# Patient Record
Sex: MALE | Race: WHITE | NOT HISPANIC OR LATINO
[De-identification: names, ages, dates, MRNs, and addresses within clinical notes are randomized per-mention and may not be internally consistent; named-entity substitution may affect disease eponyms.]

---

## 2017-02-10 VITALS
DIASTOLIC BLOOD PRESSURE: 81 MMHG | OXYGEN SATURATION: 100 % | RESPIRATION RATE: 16 BRPM | HEIGHT: 70 IN | TEMPERATURE: 96 F | SYSTOLIC BLOOD PRESSURE: 130 MMHG | WEIGHT: 173.94 LBS | HEART RATE: 63 BPM

## 2017-02-10 NOTE — PATIENT PROFILE ADULT. - PSH
H/O arthroscopy of right knee  x2  H/O breast surgery  left  H/O lithotripsy    History of appendectomy

## 2017-02-10 NOTE — PATIENT PROFILE ADULT. - ABILITY TO HEAR (WITH HEARING AID OR HEARING APPLIANCE IF NORMALLY USED):
uses hearing aid left ear/Mildly to Moderately Impaired: difficulty hearing in some environments or speaker may need to increase volume or speak distinctly

## 2017-02-12 ENCOUNTER — RESULT REVIEW (OUTPATIENT)
Age: 47
End: 2017-02-12

## 2017-02-13 ENCOUNTER — INPATIENT (INPATIENT)
Facility: HOSPITAL | Age: 47
LOS: 0 days | Discharge: HOME | DRG: 543 | End: 2017-02-13
Attending: DENTIST | Admitting: DENTIST
Payer: COMMERCIAL

## 2017-02-13 VITALS — HEART RATE: 60 BPM | DIASTOLIC BLOOD PRESSURE: 82 MMHG | OXYGEN SATURATION: 98 % | SYSTOLIC BLOOD PRESSURE: 132 MMHG

## 2017-02-13 DIAGNOSIS — Z98.890 OTHER SPECIFIED POSTPROCEDURAL STATES: Chronic | ICD-10-CM

## 2017-02-13 DIAGNOSIS — Z90.49 ACQUIRED ABSENCE OF OTHER SPECIFIED PARTS OF DIGESTIVE TRACT: Chronic | ICD-10-CM

## 2017-02-13 PROCEDURE — 73706 CT ANGIO LWR EXTR W/O&W/DYE: CPT | Mod: 26,50

## 2017-02-13 RX ORDER — MORPHINE SULFATE 50 MG/1
4 CAPSULE, EXTENDED RELEASE ORAL
Qty: 0 | Refills: 0 | Status: DISCONTINUED | OUTPATIENT
Start: 2017-02-13 | End: 2017-02-13

## 2017-02-13 RX ORDER — CHLORHEXIDINE GLUCONATE 213 G/1000ML
3 SOLUTION TOPICAL
Qty: 1 | Refills: 0 | OUTPATIENT
Start: 2017-02-13 | End: 2017-02-18

## 2017-02-13 RX ORDER — ONDANSETRON 8 MG/1
4 TABLET, FILM COATED ORAL EVERY 6 HOURS
Qty: 0 | Refills: 0 | Status: DISCONTINUED | OUTPATIENT
Start: 2017-02-13 | End: 2017-02-13

## 2017-02-13 RX ADMIN — ONDANSETRON 4 MILLIGRAM(S): 8 TABLET, FILM COATED ORAL at 12:22

## 2017-02-14 PROCEDURE — 88331 PATH CONSLTJ SURG 1 BLK 1SPC: CPT

## 2017-02-14 PROCEDURE — 73706 CT ANGIO LWR EXTR W/O&W/DYE: CPT

## 2017-02-14 PROCEDURE — 88302 TISSUE EXAM BY PATHOLOGIST: CPT

## 2017-02-14 PROCEDURE — 88311 DECALCIFY TISSUE: CPT

## 2017-02-14 PROCEDURE — 88305 TISSUE EXAM BY PATHOLOGIST: CPT

## 2017-02-15 LAB — SURGICAL PATHOLOGY STUDY: SIGNIFICANT CHANGE UP

## 2017-02-16 ENCOUNTER — APPOINTMENT (OUTPATIENT)
Dept: PLASTIC SURGERY | Facility: CLINIC | Age: 47
End: 2017-02-16

## 2017-02-16 DIAGNOSIS — C41.1 MALIGNANT NEOPLASM OF MANDIBLE: ICD-10-CM

## 2017-02-16 DIAGNOSIS — D49.2 NEOPLASM OF UNSPECIFIED BEHAVIOR OF BONE, SOFT TISSUE, AND SKIN: ICD-10-CM

## 2017-02-16 DIAGNOSIS — I10 ESSENTIAL (PRIMARY) HYPERTENSION: ICD-10-CM

## 2017-02-16 DIAGNOSIS — E78.5 HYPERLIPIDEMIA, UNSPECIFIED: ICD-10-CM

## 2017-02-16 DIAGNOSIS — G47.33 OBSTRUCTIVE SLEEP APNEA (ADULT) (PEDIATRIC): ICD-10-CM

## 2017-02-16 DIAGNOSIS — Z88.0 ALLERGY STATUS TO PENICILLIN: ICD-10-CM

## 2017-02-16 DIAGNOSIS — K50.90 CROHN'S DISEASE, UNSPECIFIED, WITHOUT COMPLICATIONS: ICD-10-CM

## 2017-02-16 RX ORDER — RIFAXIMIN 550 MG/1
550 TABLET ORAL
Refills: 0 | Status: ACTIVE | COMMUNITY

## 2017-02-16 RX ORDER — ATORVASTATIN CALCIUM 10 MG/1
10 TABLET, FILM COATED ORAL
Refills: 0 | Status: ACTIVE | COMMUNITY

## 2017-02-16 RX ORDER — LOSARTAN POTASSIUM AND HYDROCHLOROTHIAZIDE 12.5; 5 MG/1; MG/1
50-12.5 TABLET ORAL
Refills: 0 | Status: ACTIVE | COMMUNITY

## 2017-02-28 ENCOUNTER — RESULT REVIEW (OUTPATIENT)
Age: 47
End: 2017-02-28

## 2017-02-28 VITALS — HEIGHT: 71 IN | WEIGHT: 173.06 LBS

## 2017-02-28 NOTE — PATIENT PROFILE ADULT. - ABILITY TO HEAR (WITH HEARING AID OR HEARING APPLIANCE IF NORMALLY USED):
Mildly to Moderately Impaired: difficulty hearing in some environments or speaker may need to increase volume or speak distinctly/uses hearing aid left ear

## 2017-02-28 NOTE — PATIENT PROFILE ADULT. - PSH
H/O arthroscopy of right knee  x2  H/O breast surgery  left  H/O lithotripsy    History of appendectomy H/O arthroscopy of right knee  x2  H/O breast surgery  left  H/O lithotripsy    History of appendectomy    Surgery, elective  Mole removed from back 2/2017

## 2017-03-01 ENCOUNTER — INPATIENT (INPATIENT)
Facility: HOSPITAL | Age: 47
LOS: 7 days | Discharge: HOME | DRG: 129 | End: 2017-03-09
Attending: DENTIST | Admitting: DENTIST
Payer: COMMERCIAL

## 2017-03-01 DIAGNOSIS — Z41.9 ENCOUNTER FOR PROCEDURE FOR PURPOSES OTHER THAN REMEDYING HEALTH STATE, UNSPECIFIED: Chronic | ICD-10-CM

## 2017-03-01 DIAGNOSIS — Z98.890 OTHER SPECIFIED POSTPROCEDURAL STATES: Chronic | ICD-10-CM

## 2017-03-01 DIAGNOSIS — Z90.49 ACQUIRED ABSENCE OF OTHER SPECIFIED PARTS OF DIGESTIVE TRACT: Chronic | ICD-10-CM

## 2017-03-01 LAB
ANION GAP SERPL CALC-SCNC: 7 MMOL/L — LOW (ref 9–16)
APTT BLD: 29.9 SEC — SIGNIFICANT CHANGE UP (ref 27.5–37.4)
BUN SERPL-MCNC: 17 MG/DL — SIGNIFICANT CHANGE UP (ref 7–23)
CALCIUM SERPL-MCNC: 8.8 MG/DL — SIGNIFICANT CHANGE UP (ref 8.5–10.5)
CHLORIDE SERPL-SCNC: 104 MMOL/L — SIGNIFICANT CHANGE UP (ref 96–108)
CO2 SERPL-SCNC: 28 MMOL/L — SIGNIFICANT CHANGE UP (ref 22–31)
CREAT SERPL-MCNC: 0.78 MG/DL — SIGNIFICANT CHANGE UP (ref 0.5–1.3)
GLUCOSE SERPL-MCNC: 144 MG/DL — HIGH (ref 70–99)
HCT VFR BLD CALC: 37 % — LOW (ref 39–50)
HGB BLD-MCNC: 13.1 G/DL — SIGNIFICANT CHANGE UP (ref 13–17)
INR BLD: 1.23 — HIGH (ref 0.88–1.16)
MAGNESIUM SERPL-MCNC: 1.8 MG/DL — SIGNIFICANT CHANGE UP (ref 1.6–2.4)
MCHC RBC-ENTMCNC: 30 PG — SIGNIFICANT CHANGE UP (ref 27–34)
MCHC RBC-ENTMCNC: 35.4 G/DL — SIGNIFICANT CHANGE UP (ref 32–36)
MCV RBC AUTO: 84.9 FL — SIGNIFICANT CHANGE UP (ref 80–100)
PHOSPHATE SERPL-MCNC: 3.3 MG/DL — SIGNIFICANT CHANGE UP (ref 2.5–4.5)
PLATELET # BLD AUTO: 263 K/UL — SIGNIFICANT CHANGE UP (ref 150–400)
POTASSIUM SERPL-MCNC: 3.9 MMOL/L — SIGNIFICANT CHANGE UP (ref 3.5–5.3)
POTASSIUM SERPL-SCNC: 3.9 MMOL/L — SIGNIFICANT CHANGE UP (ref 3.5–5.3)
PROTHROM AB SERPL-ACNC: 13.7 SEC — HIGH (ref 10–13.1)
RBC # BLD: 4.36 M/UL — SIGNIFICANT CHANGE UP (ref 4.2–5.8)
RBC # FLD: 12.1 % — SIGNIFICANT CHANGE UP (ref 10.3–16.9)
SODIUM SERPL-SCNC: 139 MMOL/L — SIGNIFICANT CHANGE UP (ref 135–145)
WBC # BLD: 16.3 K/UL — HIGH (ref 3.8–10.5)
WBC # FLD AUTO: 16.3 K/UL — HIGH (ref 3.8–10.5)

## 2017-03-01 PROCEDURE — 40845 RECONSTRUCTION OF MOUTH: CPT | Mod: 59

## 2017-03-01 PROCEDURE — 99221 1ST HOSP IP/OBS SF/LOW 40: CPT | Mod: GC

## 2017-03-01 PROCEDURE — 20962 OTHER BONE GRAFT MICROVASC: CPT

## 2017-03-01 PROCEDURE — 21198 RECONSTR LWR JAW SEGMENT: CPT | Mod: 59

## 2017-03-01 PROCEDURE — 71010: CPT | Mod: 26

## 2017-03-01 RX ORDER — DEXAMETHASONE 0.5 MG/5ML
8 ELIXIR ORAL EVERY 12 HOURS
Qty: 0 | Refills: 0 | Status: DISCONTINUED | OUTPATIENT
Start: 2017-03-01 | End: 2017-03-03

## 2017-03-01 RX ORDER — MORPHINE SULFATE 50 MG/1
4 CAPSULE, EXTENDED RELEASE ORAL EVERY 4 HOURS
Qty: 0 | Refills: 0 | Status: DISCONTINUED | OUTPATIENT
Start: 2017-03-01 | End: 2017-03-02

## 2017-03-01 RX ORDER — LOSARTAN POTASSIUM 100 MG/1
50 TABLET, FILM COATED ORAL DAILY
Qty: 0 | Refills: 0 | Status: DISCONTINUED | OUTPATIENT
Start: 2017-03-01 | End: 2017-03-07

## 2017-03-01 RX ORDER — ONDANSETRON 8 MG/1
4 TABLET, FILM COATED ORAL EVERY 6 HOURS
Qty: 0 | Refills: 0 | Status: DISCONTINUED | OUTPATIENT
Start: 2017-03-01 | End: 2017-03-09

## 2017-03-01 RX ORDER — LABETALOL HCL 100 MG
10 TABLET ORAL EVERY 6 HOURS
Qty: 0 | Refills: 0 | Status: DISCONTINUED | OUTPATIENT
Start: 2017-03-01 | End: 2017-03-09

## 2017-03-01 RX ORDER — SODIUM CHLORIDE 9 MG/ML
1000 INJECTION INTRAMUSCULAR; INTRAVENOUS; SUBCUTANEOUS
Qty: 0 | Refills: 0 | Status: DISCONTINUED | OUTPATIENT
Start: 2017-03-01 | End: 2017-03-03

## 2017-03-01 RX ADMIN — Medication 101.6 MILLIGRAM(S): at 19:27

## 2017-03-01 RX ADMIN — Medication 100 MILLIGRAM(S): at 22:26

## 2017-03-01 RX ADMIN — MORPHINE SULFATE 4 MILLIGRAM(S): 50 CAPSULE, EXTENDED RELEASE ORAL at 18:13

## 2017-03-01 RX ADMIN — MORPHINE SULFATE 4 MILLIGRAM(S): 50 CAPSULE, EXTENDED RELEASE ORAL at 19:27

## 2017-03-01 RX ADMIN — ONDANSETRON 4 MILLIGRAM(S): 8 TABLET, FILM COATED ORAL at 19:55

## 2017-03-01 NOTE — PROGRESS NOTE ADULT - SUBJECTIVE AND OBJECTIVE BOX
ENT HNS Post-Op    Patient doing well. Pain controlled. Breathing comfortably. No N/V.    AFVSS, NAD  Arterial and venous doppler signals normal  OC/OP: resection incision line intact  Right neck: flat, incision c/d/i, implanted doppler wires secured  Left lower leg wrapped, MARCO A with serosang output  NGT sutured to membranous septum. L nasal ala erythema minimal, blister-like lesion present    A/P: POD#0 s/p R segmental mandibulectomy, L fibula MVFF recon, R neck exploration. Doing well  - NPO/ IVF  - confirm NGT placement w/ CXR  - Patient should be normotensive. No pressors. Please give fluid boluses only if hypotensive. SBP < 160  - HOB elevated, no circumferential neck ties or pressure on right neck. Neck should be straight in neutral position or slight flexion  - Clindamycin  - Decadron taper  - Bed rest today, can be OOB to chair tomorow  - SQH and oral rinses start tomorrow  - No ASA

## 2017-03-01 NOTE — CONSULT NOTE ADULT - SUBJECTIVE AND OBJECTIVE BOX
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 Now indicated for right mandibular body resection and referred for consult for free osteocutaneous fibula flap reconstruction.       PAST MEDICAL & SURGICAL HISTORY:  Kidney stones  JORGITO on CPAP  Crohns disease: mild  Arthritis  Hypertension  Surgery, elective: Mole removed from back 2/2017  H/O breast surgery: left  H/O arthroscopy of right knee: x2  H/O lithotripsy  History of appendectomy      ROS: See HPI    MEDICATIONS  (STANDING):  sodium chloride 0.9%. 1000milliLiter(s) IV Continuous <Continuous>  dexamethasone  IVPB 8milliGRAM(s) IV Intermittent every 12 hours  clindamycin IVPB  IV Intermittent     MEDICATIONS  (PRN):  ondansetron Injectable 4milliGRAM(s) IV Push every 6 hours PRN Nausea  morphine  - Injectable 4milliGRAM(s) IV Push every 4 hours PRN Severe Pain (7 - 10)  oxyCODONE  5 mG/acetaminophen 325 mG 2Tablet(s) Oral every 4 hours PRN Mild Pain (1 - 3)  labetalol Injectable 10milliGRAM(s) IV Push every 6 hours PRN Systolic blood pressure > 170      Allergies    penicillins (Hives)    Intolerances        SOCIAL HISTORY:  Smoke: Never Smoker  EtOH: occasional    FAMILY HISTORY:      Vital Signs Last 24 Hrs  T(C): --  T(F): --  HR: --  BP: --  BP(mean): --  RR: --  SpO2: --    PHYSICAL EXAM    Gen: NAD   CV: RRR  Pulm: CTAB  Abd: Soft, NT/ND  Ext:     LABS:                RADIOLOGY & ADDITIONAL STUDIES:  48 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 Now indicated for right mandibular body resection and referred for consult for free osteocutaneous fibula flap reconstruction.       PAST MEDICAL & SURGICAL HISTORY:  Kidney stones  JORGITO on CPAP  Crohns disease: mild  Arthritis  Hypertension  Surgery, elective: Mole removed from back 2/2017  H/O breast surgery: left  H/O arthroscopy of right knee: x2  H/O lithotripsy  History of appendectomy      ROS: See HPI    MEDICATIONS  (STANDING):  sodium chloride 0.9%. 1000milliLiter(s) IV Continuous <Continuous>  dexamethasone  IVPB 8milliGRAM(s) IV Intermittent every 12 hours  clindamycin IVPB  IV Intermittent     MEDICATIONS  (PRN):  ondansetron Injectable 4milliGRAM(s) IV Push every 6 hours PRN Nausea  morphine  - Injectable 4milliGRAM(s) IV Push every 4 hours PRN Severe Pain (7 - 10)  oxyCODONE  5 mG/acetaminophen 325 mG 2Tablet(s) Oral every 4 hours PRN Mild Pain (1 - 3)  labetalol Injectable 10milliGRAM(s) IV Push every 6 hours PRN Systolic blood pressure > 170      Allergies    penicillins (Hives)    Intolerances        SOCIAL HISTORY:  Smoke: Never Smoker  EtOH: occasional      Home Meds: Lipitor 10 MG Oral Tablet, Losartan Potassium-HCTZ 50-12.5 MG Oral Tablet, Xifaxan 550 MG Oral Tablet  All: PCN;s      Vital Signs Last 24 Hrs(Pending)   T(C): --  T(F): --  HR: --  BP: --  BP(mean): --  RR: --  SpO2: --    PHYSICAL EXAM    Gen: NAD   Neuro: A&ox3 No deficits  ENT: incisions c/d/i doppler+   CV: RRR reg s1 s2   Pulm: CTAB  Abd: Soft, NT/ND  Ext: wwp no c/c/e + dp b/l + ace bandage in place to left leg    LABS:  pending               RADIOLOGY & ADDITIONAL STUDIES:  48 y/o m with mandibular ameloblastoma now s/p mandibulectomy and fibular free flap  Assessment and Plan:  Neuro: decadron 8 q12, morphine, percocet  ENT: Flap check q 1   CV: HD stable normo< 10 sbp goal labetalol NS @100  Pulm: Satting well on   GI: npo NGT sutured in place  : Holly  ID: unasyn ( 3/1-) clindamycin( 3/1- )   Endo: ISS  PPX: SCD   Lines: PIV Karlene HPI:   48 yo male with right jaw pain ~ 6-7 months went for evaluation by dentist and eventually seen by oral surgeon and had panorex x-ray which demonstrated 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 Now indicated for right mandibular body resection and referred for consult for free osteocutaneous fibula flap reconstruction.       PAST MEDICAL & SURGICAL HISTORY:  Kidney stones  JORGITO on CPAP  Crohns disease: mild  Arthritis  Hypertension  Surgery, elective: Mole removed from back 2/2017  H/O breast surgery: left  H/O arthroscopy of right knee: x2  H/O lithotripsy  History of appendectomy      ROS: + right shoulder pain. No HA, dizzyness, CP, sob, abd pain, dysuria, nausea    MEDICATIONS  (STANDING):  sodium chloride 0.9%. 1000milliLiter(s) IV Continuous <Continuous>  dexamethasone  IVPB 8milliGRAM(s) IV Intermittent every 12 hours  clindamycin IVPB  IV Intermittent     MEDICATIONS  (PRN):  ondansetron Injectable 4milliGRAM(s) IV Push every 6 hours PRN Nausea  morphine  - Injectable 4milliGRAM(s) IV Push every 4 hours PRN Severe Pain (7 - 10)  oxyCODONE  5 mG/acetaminophen 325 mG 2Tablet(s) Oral every 4 hours PRN Mild Pain (1 - 3)  labetalol Injectable 10milliGRAM(s) IV Push every 6 hours PRN Systolic blood pressure > 170      Allergies    penicillins (Hives)    Intolerances        SOCIAL HISTORY:  Smoke: Never Smoker  EtOH: occasional      Home Meds: Lipitor 10 MG Oral Tablet, Losartan Potassium-HCTZ 50-12.5 MG Oral Tablet, Xifaxan 550 MG Oral Tablet  All: PCN;s      Vital Signs Last 24 Hrs(Pending)   T(C): --  T(F): --37.2  HR: --64  BP: --172/88  BP(mean): --  RR: --16  SpO2: --98%    PHYSICAL EXAM    Gen: NAD   HEENT: MMM PERRL, EOMI  Neuro: A&ox3 No deficits  ENT: incisions c/d/i doppler+   CV: RRR reg s1 s2   Pulm: CTAB  Abd: Soft, NT/ND   elizondo in place  Ext/vasc: wwp no c/c/e + dp b/l + ace bandage in place to left leg  MSK: no joint swelling    LABS:  pending               RADIOLOGY & ADDITIONAL STUDIES: CXR clear  48 y/o m with h/o HTN with mandibular ameloblastoma now s/p mandibulectomy and fibular free flap  Assessment and Plan:  Neuro: decadron 8 q12, morphine, percocet  ENT: Flap check q 1   CV: HD stable normo< 160 sbp goal labetalol NS @100  Pulm: Satting well on NC  GI: npo NGT sutured in place  : Elizondo  ID: unasyn ( 3/1-) clindamycin( 3/1- )   Endo: ISS  PPX: SCD   Lines: JENNIFER Soler

## 2017-03-01 NOTE — H&P ADULT - HISTORY OF PRESENT ILLNESS
47 M found by dentist to have R mandibular body mass on x-ray after having discomfort for several months. 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 Admitted today for right segmental mandibulectomy, fibula free flap recon, and neck exploration.    PMH: Chron's, HTN, HLD  Allergies: PCN

## 2017-03-01 NOTE — BRIEF OPERATIVE NOTE - PROCEDURE
Incision and exploration, neck  03/01/2017  right  Active  OAHMED  Free fibula flap reconstruction of mandible  03/01/2017  left  Active  OAHMED  Segmental mandibulectomy  03/01/2017  right  Active  OAHMED

## 2017-03-02 LAB
ANION GAP SERPL CALC-SCNC: 6 MMOL/L — LOW (ref 9–16)
BUN SERPL-MCNC: 17 MG/DL — SIGNIFICANT CHANGE UP (ref 7–23)
CALCIUM SERPL-MCNC: 8.7 MG/DL — SIGNIFICANT CHANGE UP (ref 8.5–10.5)
CHLORIDE SERPL-SCNC: 103 MMOL/L — SIGNIFICANT CHANGE UP (ref 96–108)
CO2 SERPL-SCNC: 28 MMOL/L — SIGNIFICANT CHANGE UP (ref 22–31)
CREAT SERPL-MCNC: 0.76 MG/DL — SIGNIFICANT CHANGE UP (ref 0.5–1.3)
GLUCOSE SERPL-MCNC: 137 MG/DL — HIGH (ref 70–99)
HCT VFR BLD CALC: 35.6 % — LOW (ref 39–50)
HGB BLD-MCNC: 12.2 G/DL — LOW (ref 13–17)
MAGNESIUM SERPL-MCNC: 1.8 MG/DL — SIGNIFICANT CHANGE UP (ref 1.6–2.4)
MCHC RBC-ENTMCNC: 29.6 PG — SIGNIFICANT CHANGE UP (ref 27–34)
MCHC RBC-ENTMCNC: 34.3 G/DL — SIGNIFICANT CHANGE UP (ref 32–36)
MCV RBC AUTO: 86.4 FL — SIGNIFICANT CHANGE UP (ref 80–100)
PHOSPHATE SERPL-MCNC: 3 MG/DL — SIGNIFICANT CHANGE UP (ref 2.5–4.5)
PLATELET # BLD AUTO: 256 K/UL — SIGNIFICANT CHANGE UP (ref 150–400)
POTASSIUM SERPL-MCNC: 3.8 MMOL/L — SIGNIFICANT CHANGE UP (ref 3.5–5.3)
POTASSIUM SERPL-SCNC: 3.8 MMOL/L — SIGNIFICANT CHANGE UP (ref 3.5–5.3)
RBC # BLD: 4.12 M/UL — LOW (ref 4.2–5.8)
RBC # FLD: 12.7 % — SIGNIFICANT CHANGE UP (ref 10.3–16.9)
SODIUM SERPL-SCNC: 137 MMOL/L — SIGNIFICANT CHANGE UP (ref 135–145)
WBC # BLD: 17.8 K/UL — HIGH (ref 3.8–10.5)
WBC # FLD AUTO: 17.8 K/UL — HIGH (ref 3.8–10.5)

## 2017-03-02 PROCEDURE — 71010: CPT | Mod: 26

## 2017-03-02 RX ORDER — ENOXAPARIN SODIUM 100 MG/ML
40 INJECTION SUBCUTANEOUS DAILY
Qty: 0 | Refills: 0 | Status: DISCONTINUED | OUTPATIENT
Start: 2017-03-02 | End: 2017-03-09

## 2017-03-02 RX ORDER — DIPHENHYDRAMINE HCL 50 MG
25 CAPSULE ORAL ONCE
Qty: 0 | Refills: 0 | Status: COMPLETED | OUTPATIENT
Start: 2017-03-02 | End: 2017-03-02

## 2017-03-02 RX ORDER — ACETAMINOPHEN 500 MG
1000 TABLET ORAL ONCE
Qty: 0 | Refills: 0 | Status: COMPLETED | OUTPATIENT
Start: 2017-03-02 | End: 2017-03-02

## 2017-03-02 RX ORDER — MORPHINE SULFATE 50 MG/1
2 CAPSULE, EXTENDED RELEASE ORAL EVERY 4 HOURS
Qty: 0 | Refills: 0 | Status: DISCONTINUED | OUTPATIENT
Start: 2017-03-02 | End: 2017-03-06

## 2017-03-02 RX ORDER — POTASSIUM CHLORIDE 20 MEQ
10 PACKET (EA) ORAL ONCE
Qty: 0 | Refills: 0 | Status: COMPLETED | OUTPATIENT
Start: 2017-03-02 | End: 2017-03-02

## 2017-03-02 RX ORDER — MAGNESIUM SULFATE 500 MG/ML
1 VIAL (ML) INJECTION ONCE
Qty: 0 | Refills: 0 | Status: COMPLETED | OUTPATIENT
Start: 2017-03-02 | End: 2017-03-02

## 2017-03-02 RX ADMIN — MORPHINE SULFATE 4 MILLIGRAM(S): 50 CAPSULE, EXTENDED RELEASE ORAL at 03:15

## 2017-03-02 RX ADMIN — Medication 100 MILLIGRAM(S): at 07:19

## 2017-03-02 RX ADMIN — Medication 100 GRAM(S): at 09:26

## 2017-03-02 RX ADMIN — ENOXAPARIN SODIUM 40 MILLIGRAM(S): 100 INJECTION SUBCUTANEOUS at 13:23

## 2017-03-02 RX ADMIN — Medication 25 MILLIGRAM(S): at 22:00

## 2017-03-02 RX ADMIN — Medication 400 MILLIGRAM(S): at 22:00

## 2017-03-02 RX ADMIN — Medication 101.6 MILLIGRAM(S): at 19:29

## 2017-03-02 RX ADMIN — MORPHINE SULFATE 4 MILLIGRAM(S): 50 CAPSULE, EXTENDED RELEASE ORAL at 04:17

## 2017-03-02 RX ADMIN — Medication 1000 MILLIGRAM(S): at 13:23

## 2017-03-02 RX ADMIN — Medication 400 MILLIGRAM(S): at 13:23

## 2017-03-02 RX ADMIN — Medication 1000 MILLIGRAM(S): at 22:52

## 2017-03-02 RX ADMIN — LOSARTAN POTASSIUM 50 MILLIGRAM(S): 100 TABLET, FILM COATED ORAL at 13:23

## 2017-03-02 RX ADMIN — Medication 100 MILLIGRAM(S): at 16:08

## 2017-03-02 RX ADMIN — Medication 101.6 MILLIGRAM(S): at 07:03

## 2017-03-02 NOTE — DIETITIAN INITIAL EVALUATION ADULT. - ORAL INTAKE PTA
good/Pt with good appetite and consuming 3 meals/day at home. Pt was taking fish oil, vitamin D, and probiotics, however, stopped several weeks ago 2/2 to MD advice prior to surgery.

## 2017-03-02 NOTE — PROGRESS NOTE ADULT - SUBJECTIVE AND OBJECTIVE BOX
Patient doing well Over night   Up and awake during exam, (alert and oriented )     Donor site Looks intact (splint on at the moment)   Neck incision intact  Dopplers on the artery and vein both present   Drain in neck and leg are puttign out 50 cc respectively   Mostly serosanguinous in quality     Intra oral examination normal       Plan  Disposition as per ENT  Plastics ok with ambulation but needs to have neck neutral   Partial weight bearing on the Donor site is ok (however may be difficult with splint on at the moment)

## 2017-03-02 NOTE — DIETITIAN INITIAL EVALUATION ADULT. - NS AS NUTRI INTERV ED CONTENT
Discussed initiation of TF through NG tube; pt and wife verbalized understanding/Purpose of the nutrition education

## 2017-03-02 NOTE — PROGRESS NOTE ADULT - SUBJECTIVE AND OBJECTIVE BOX
ENT [facility] DAILY PROGRESS NOTE    Interval HPI: 47y Male s/p R segmental mandibulectomy and R neck exploration with fibula free flap reconstruction on 3/1/17. POD 0: H/H stable. Flap well perfused.     Overnight events: POD 1- no acute events overnight, Pain controlled. Breathing comfortably. No N/V. Flap well perfused, NGT advanced.       Allergies  penicillins (Hives)      MEDICATIONS:  Antiinfectives:   clindamycin IVPB  IV Intermittent   clindamycin IVPB 600milliGRAM(s) IV Intermittent every 8 hours    IV fluids:  sodium chloride 0.9%. 1000milliLiter(s) IV Continuous <Continuous>  potassium chloride  10 mEq/100 mL IVPB 10milliEquivalent(s) IV Intermittent once    Hematologic/Anticoagulation:  enoxaparin Injectable 40milliGRAM(s) SubCutaneous daily    Pain medications/Neuro:  ondansetron Injectable 4milliGRAM(s) IV Push every 6 hours PRN  oxyCODONE  5 mG/acetaminophen 325 mG 2Tablet(s) Oral every 4 hours PRN    Endocrine Medications:   dexamethasone  IVPB 8milliGRAM(s) IV Intermittent every 12 hours    All other standing medications:   losartan 50milliGRAM(s) Oral daily    All other PRN medications:  labetalol Injectable 10milliGRAM(s) IV Push every 6 hours PRN      Vital Signs Last 24 Hrs  T(C): 36.8, Max: 37.1 (03-01 @ 17:50)  T(F): 98.3, Max: 98.7 (03-01 @ 17:50)  HR: 54 (50 - 70)  BP: 126/68 (126/68 - 150/78)  BP(mean): 93 (67 - 108)  RR: 13 (8 - 20)  SpO2: 99% (94% - 100%)    I & Os for 24h ending 03-02 @ 07:00  =============================================  IN:    sodium chloride 0.9%.: 1150 ml    IV PiggyBack: 250 ml    Total IN: 1400 ml  ---------------------------------------------  OUT:    Indwelling Catheter - Urethral: 920 ml    Bulb: 50 ml    Bulb: 50 ml    Total OUT: 1020 ml  ---------------------------------------------  Total NET: 380 ml    I & Os for current day (as of 03-02 @ 10:22)  =============================================  IN:    sodium chloride 0.9%.: 100 ml    Total IN: 100 ml  ---------------------------------------------  OUT:    Indwelling Catheter - Urethral: 525 ml    Total OUT: 525 ml  ---------------------------------------------  Total NET: -425 ml        PHYSICAL EXAM:    AFVSS, NAD  Arterial and venous doppler signals normal  OC/OP: resection incision line intact  Right neck: flat, incision c/d/i, implanted doppler wires secured  Left lower leg in splint, MARCO A with serosang output  NGT sutured to membranous septum. L nasal ala erythema minimal, blister-like lesion present    LABS:  CBC-                        12.2   17.8  )-----------( 256      ( 02 Mar 2017 04:21 )             35.6     BMP/CMP-  02 Mar 2017 04:21    137    |  103    |  17     ----------------------------<  137    3.8     |  28     |  0.76     Ca    8.7        02 Mar 2017 04:21  Phos  3.0       02 Mar 2017 04:21  Mg     1.8       02 Mar 2017 04:21      Coagulation Studies-  PT/INR - ( 01 Mar 2017 17:53 )   PT: 13.7 sec;   INR: 1.23     PTT - ( 01 Mar 2017 17:53 )  PTT:29.9 sec      Assessment/Plan:  47y Male POD#1 s/p R segmental mandibulectomy, L fibula MVFF recon, R neck exploration.   - NPO/ IVF  - confirm NGT placement w/ CXR then OK to use for meds  - OK to start trickle feeds if no n/v once NGT confirmed in place  - Patient should be normotensive. No pressors. Please give fluid boluses only if hypotensive. SBP < 160  - HOB elevated, no circumferential neck ties or pressure on right neck. Neck should be straight in neutral position or slight flexion  - Clindamycin  - Decadron taper  - OOB to chair with leg elevated when OOB, can be partial weight bearing to L leg  - SQH and oral rinses start to start today  - No ASA      - d/w attending MD Elan Lawler whom agrees with the above plan      PPX: SCDs, DVT ppx with Lovenox per Dr. Lerman    Dispo planning:   -Home care TBD by PT needs ENT [facility] DAILY PROGRESS NOTE    Interval HPI: 47y Male s/p R segmental mandibulectomy and R neck exploration with fibula free flap reconstruction on 3/1/17. POD 0: H/H stable. Flap well perfused.     Overnight events: POD 1- no acute events overnight, Pain controlled. Breathing comfortably. No N/V. Flap well perfused, NGT advanced.       Allergies  penicillins (Hives)      MEDICATIONS:  Antiinfectives:   clindamycin IVPB  IV Intermittent   clindamycin IVPB 600milliGRAM(s) IV Intermittent every 8 hours    IV fluids:  sodium chloride 0.9%. 1000milliLiter(s) IV Continuous <Continuous>  potassium chloride  10 mEq/100 mL IVPB 10milliEquivalent(s) IV Intermittent once    Hematologic/Anticoagulation:  enoxaparin Injectable 40milliGRAM(s) SubCutaneous daily    Pain medications/Neuro:  ondansetron Injectable 4milliGRAM(s) IV Push every 6 hours PRN  oxyCODONE  5 mG/acetaminophen 325 mG 2Tablet(s) Oral every 4 hours PRN    Endocrine Medications:   dexamethasone  IVPB 8milliGRAM(s) IV Intermittent every 12 hours    All other standing medications:   losartan 50milliGRAM(s) Oral daily    All other PRN medications:  labetalol Injectable 10milliGRAM(s) IV Push every 6 hours PRN      Vital Signs Last 24 Hrs  T(C): 36.8, Max: 37.1 (03-01 @ 17:50)  T(F): 98.3, Max: 98.7 (03-01 @ 17:50)  HR: 54 (50 - 70)  BP: 126/68 (126/68 - 150/78)  BP(mean): 93 (67 - 108)  RR: 13 (8 - 20)  SpO2: 99% (94% - 100%)    I & Os for 24h ending 03-02 @ 07:00  =============================================  IN:    sodium chloride 0.9%.: 1150 ml    IV PiggyBack: 250 ml    Total IN: 1400 ml  ---------------------------------------------  OUT:    Indwelling Catheter - Urethral: 920 ml    Bulb: 50 ml    Bulb: 50 ml    Total OUT: 1020 ml  ---------------------------------------------  Total NET: 380 ml    I & Os for current day (as of 03-02 @ 10:22)  =============================================  IN:    sodium chloride 0.9%.: 100 ml    Total IN: 100 ml  ---------------------------------------------  OUT:    Indwelling Catheter - Urethral: 525 ml    Total OUT: 525 ml  ---------------------------------------------  Total NET: -425 ml        PHYSICAL EXAM:    AFVSS, NAD  Arterial and venous doppler signals normal  OC/OP: resection incision line intact  Right neck: flat, incision c/d/i, implanted doppler wires secured  Left lower leg in splint, MARCO A with serosang output  NGT sutured to membranous septum. L nasal ala erythema minimal, blister-like lesion present    LABS:  CBC-                        12.2   17.8  )-----------( 256      ( 02 Mar 2017 04:21 )             35.6     BMP/CMP-  02 Mar 2017 04:21    137    |  103    |  17     ----------------------------<  137    3.8     |  28     |  0.76     Ca    8.7        02 Mar 2017 04:21  Phos  3.0       02 Mar 2017 04:21  Mg     1.8       02 Mar 2017 04:21      Coagulation Studies-  PT/INR - ( 01 Mar 2017 17:53 )   PT: 13.7 sec;   INR: 1.23     PTT - ( 01 Mar 2017 17:53 )  PTT:29.9 sec      Assessment/Plan:  47y Male POD#1 s/p R segmental mandibulectomy, L fibula MVFF recon, R neck exploration.   - NPO/ IVF  - confirm NGT placement w/ CXR then OK to use for meds  - OK to start trickle feeds if no n/v once NGT confirmed in place  - Patient should be normotensive. No pressors. Please give fluid boluses only if hypotensive. SBP < 160  - HOB elevated, no circumferential neck ties or pressure on right neck. Neck should be straight in neutral position or slight flexion  - Clindamycin  - Decadron taper  - OOB to chair with leg elevated when OOB, can be partial weight bearing to L leg with splint  - oral rinses start to start today  - No ASA per Dr. Lerman      - d/w attending MD Elan Lawler whom agrees with the above plan      PPX: SCDs, DVT ppx with Lovenox per Dr. Lerman    Dispo planning:   -Home care TBD by PT needs

## 2017-03-02 NOTE — DIETITIAN INITIAL EVALUATION ADULT. - NS AS NUTRI INTERV ENTERAL NUTRITION
Composition/Recommend Osmolite 1.2 via NG tube @ 70ml/hr goal rate (provides 2000kcal, 94 g pro) Recommend Osmolite 1.2 via NG tube @ 70ml/hr x 24 hr with prostat qday (provides 2116kcal, 108 g pro, 1378mL fluid)/Composition

## 2017-03-02 NOTE — DIETITIAN INITIAL EVALUATION ADULT. - OTHER INFO
Pt admitted with jaw pain x ~6 months PTA. Confirmed ameloblastoma right mandibular body and s/p right mandibular body resection, free osteocutaneous fibula flap reconstruction, and incision and exploration of neck (3/1). Pt denies pain. Currently NPO. Discussed EN nutrition recommendations with pt and wife via NG tube- pt verbalizes understanding. Denies swallowing issues. Pt reports no N/V/D/C. Last BM 3/1. Skin: no edema, surgical incision (3/1). NKFA per pt. High Nutrition Risk. Pt admitted with jaw pain x ~6 months PTA. Confirmed ameloblastoma right mandibular body and s/p right mandibular body resection, free osteocutaneous fibula flap reconstruction, and incision and exploration of neck (3/1). Pt denies pain. Currently NPO. Discussed EN nutrition recommendations with MD and team. Pt denies swallowing issues. Pt reports no N/V/D/C. Last BM 3/1. Skin: no edema, surgical incision (3/1). NKFA per pt. High Nutrition Risk.

## 2017-03-02 NOTE — DIETITIAN INITIAL EVALUATION ADULT. - SOURCE
Wife at bedside, RN, Medical Chart; no previous RD note/family/significant other/patient Wife at bedside, RN, MD and team, Medical Chart; no previous RD note/family/significant other/patient

## 2017-03-02 NOTE — DIETITIAN INITIAL EVALUATION ADULT. - ENERGY NEEDS
ht = 72 inches, wt = 78.5kg (3/1/17), BMI = 24, IBW = 78.2kg, 100% IBW  Utilized CBW due to pt within % of IBW. Estimated needs based on current medical status. ht = 72 inches, wt = 78.5kg (3/1/17), BMI = 24, IBW = 78.2kg, 100% IBW  Utilized current body weight due to pt within % of IBW. Estimated needs based on current medical status.

## 2017-03-03 LAB
ANION GAP SERPL CALC-SCNC: 7 MMOL/L — LOW (ref 9–16)
BUN SERPL-MCNC: 14 MG/DL — SIGNIFICANT CHANGE UP (ref 7–23)
CALCIUM SERPL-MCNC: 8.7 MG/DL — SIGNIFICANT CHANGE UP (ref 8.5–10.5)
CHLORIDE SERPL-SCNC: 107 MMOL/L — SIGNIFICANT CHANGE UP (ref 96–108)
CO2 SERPL-SCNC: 25 MMOL/L — SIGNIFICANT CHANGE UP (ref 22–31)
CREAT SERPL-MCNC: 0.56 MG/DL — SIGNIFICANT CHANGE UP (ref 0.5–1.3)
GLUCOSE SERPL-MCNC: 117 MG/DL — HIGH (ref 70–99)
HCT VFR BLD CALC: 35.3 % — LOW (ref 39–50)
HGB BLD-MCNC: 12 G/DL — LOW (ref 13–17)
MAGNESIUM SERPL-MCNC: 2.4 MG/DL — SIGNIFICANT CHANGE UP (ref 1.6–2.4)
MCHC RBC-ENTMCNC: 30.1 PG — SIGNIFICANT CHANGE UP (ref 27–34)
MCHC RBC-ENTMCNC: 34 G/DL — SIGNIFICANT CHANGE UP (ref 32–36)
MCV RBC AUTO: 88.5 FL — SIGNIFICANT CHANGE UP (ref 80–100)
PHOSPHATE SERPL-MCNC: 2.2 MG/DL — LOW (ref 2.5–4.5)
PLATELET # BLD AUTO: 214 K/UL — SIGNIFICANT CHANGE UP (ref 150–400)
POTASSIUM SERPL-MCNC: 4 MMOL/L — SIGNIFICANT CHANGE UP (ref 3.5–5.3)
POTASSIUM SERPL-SCNC: 4 MMOL/L — SIGNIFICANT CHANGE UP (ref 3.5–5.3)
RBC # BLD: 3.99 M/UL — LOW (ref 4.2–5.8)
RBC # FLD: 12.6 % — SIGNIFICANT CHANGE UP (ref 10.3–16.9)
SODIUM SERPL-SCNC: 139 MMOL/L — SIGNIFICANT CHANGE UP (ref 135–145)
WBC # BLD: 16.5 K/UL — HIGH (ref 3.8–10.5)
WBC # FLD AUTO: 16.5 K/UL — HIGH (ref 3.8–10.5)

## 2017-03-03 RX ORDER — SENNA PLUS 8.6 MG/1
2 TABLET ORAL AT BEDTIME
Qty: 0 | Refills: 0 | Status: DISCONTINUED | OUTPATIENT
Start: 2017-03-03 | End: 2017-03-07

## 2017-03-03 RX ORDER — DEXAMETHASONE 0.5 MG/5ML
4 ELIXIR ORAL EVERY 12 HOURS
Qty: 0 | Refills: 0 | Status: DISCONTINUED | OUTPATIENT
Start: 2017-03-03 | End: 2017-03-05

## 2017-03-03 RX ORDER — ALPRAZOLAM 0.25 MG
0.25 TABLET ORAL AT BEDTIME
Qty: 0 | Refills: 0 | Status: DISCONTINUED | OUTPATIENT
Start: 2017-03-03 | End: 2017-03-07

## 2017-03-03 RX ORDER — SODIUM BICARBONATE 1 MEQ/ML
20 SYRINGE (ML) INTRAVENOUS EVERY 4 HOURS
Qty: 0 | Refills: 0 | Status: DISCONTINUED | OUTPATIENT
Start: 2017-03-03 | End: 2017-03-09

## 2017-03-03 RX ORDER — DEXAMETHASONE 0.5 MG/5ML
4 ELIXIR ORAL EVERY 12 HOURS
Qty: 0 | Refills: 0 | Status: DISCONTINUED | OUTPATIENT
Start: 2017-03-03 | End: 2017-03-03

## 2017-03-03 RX ORDER — ACETAMINOPHEN 500 MG
1000 TABLET ORAL ONCE
Qty: 0 | Refills: 0 | Status: COMPLETED | OUTPATIENT
Start: 2017-03-03 | End: 2017-03-03

## 2017-03-03 RX ORDER — POTASSIUM PHOSPHATE, MONOBASIC POTASSIUM PHOSPHATE, DIBASIC 236; 224 MG/ML; MG/ML
15 INJECTION, SOLUTION INTRAVENOUS ONCE
Qty: 0 | Refills: 0 | Status: COMPLETED | OUTPATIENT
Start: 2017-03-03 | End: 2017-03-03

## 2017-03-03 RX ORDER — MAGNESIUM HYDROXIDE 400 MG/1
30 TABLET, CHEWABLE ORAL DAILY
Qty: 0 | Refills: 0 | Status: DISCONTINUED | OUTPATIENT
Start: 2017-03-03 | End: 2017-03-07

## 2017-03-03 RX ADMIN — Medication 1000 MILLIGRAM(S): at 13:54

## 2017-03-03 RX ADMIN — Medication 0.25 MILLIGRAM(S): at 22:48

## 2017-03-03 RX ADMIN — Medication 20 MILLILITER(S): at 22:57

## 2017-03-03 RX ADMIN — Medication 20 MILLILITER(S): at 14:29

## 2017-03-03 RX ADMIN — ENOXAPARIN SODIUM 40 MILLIGRAM(S): 100 INJECTION SUBCUTANEOUS at 12:37

## 2017-03-03 RX ADMIN — Medication 100 MILLIGRAM(S): at 10:32

## 2017-03-03 RX ADMIN — POTASSIUM PHOSPHATE, MONOBASIC POTASSIUM PHOSPHATE, DIBASIC 62.5 MILLIMOLE(S): 236; 224 INJECTION, SOLUTION INTRAVENOUS at 10:43

## 2017-03-03 RX ADMIN — Medication 4 MILLIGRAM(S): at 18:45

## 2017-03-03 RX ADMIN — Medication 400 MILLIGRAM(S): at 13:10

## 2017-03-03 RX ADMIN — Medication 20 MILLILITER(S): at 10:44

## 2017-03-03 RX ADMIN — Medication 20 MILLILITER(S): at 17:45

## 2017-03-03 RX ADMIN — Medication 100 MILLIGRAM(S): at 01:51

## 2017-03-03 RX ADMIN — Medication 100 MILLIGRAM(S): at 17:44

## 2017-03-03 NOTE — PHYSICAL THERAPY INITIAL EVALUATION ADULT - ADDITIONAL COMMENTS
Patient reports independence with all ADLs/IADLs prior to admission. Patient reports very active lifestyle prior to admission. Patient's wife (Mehnaz) will be with patient 24/7 for next 2 weeks upon discharge from [facility].

## 2017-03-03 NOTE — PROGRESS NOTE ADULT - SUBJECTIVE AND OBJECTIVE BOX
ENT [facility] DAILY PROGRESS NOTE    Overnight events/Interval HPI:47y Male s/p R segmental mandibulectomy and R neck exploration with fibula free flap reconstruction on 3/1/17. POD 0: H/H stable. Flap well perfused.     POD 1- no acute events overnight, Pain controlled. Breathing comfortably. No N/V. Flap well perfused, NGT advanced.   Overnight events: POD2: CLEMENCIA overnight. Pain controlled. denies N/V. progressing well. Flap well perfused. tolerating TF. Ready for SDU        Allergies    penicillins (Hives)    Intolerances        MEDICATIONS:  Antiinfectives:   clindamycin IVPB 600milliGRAM(s) IV Intermittent every 8 hours    IV fluids:  potassium phosphate IVPB 15milliMole(s) IV Intermittent once    Hematologic/Anticoagulation:  enoxaparin Injectable 40milliGRAM(s) SubCutaneous daily    Pain medications/Neuro:  ondansetron Injectable 4milliGRAM(s) IV Push every 6 hours PRN  morphine  - Injectable 2milliGRAM(s) IV Push every 4 hours PRN    Endocrine Medications:   dexamethasone  IVPB 8milliGRAM(s) IV Intermittent every 12 hours    All other standing medications:   losartan 50milliGRAM(s) Oral daily  sodium bicarbonate Mouth Rinse 20milliLiter(s) Swish and Spit every 4 hours  magnesium hydroxide Suspension 30milliLiter(s) Oral daily  senna 2Tablet(s) Oral at bedtime    All other PRN medications:  labetalol Injectable 10milliGRAM(s) IV Push every 6 hours PRN      Vital Signs Last 24 Hrs  T(C): 37.2, Max: 37.5 (03-02 @ 18:10)  T(F): 99, Max: 99.5 (03-02 @ 18:10)  HR: 62 (48 - 76)  BP: 132/70 (97/44 - 138/75)  BP(mean): 89 (64 - 107)  RR: 17 (6 - 20)  SpO2: 100% (96% - 100%)    I & Os for 24h ending 03-03 @ 07:00  =============================================  IN:    sodium chloride 0.9%: 2100 ml    Osmolite: 620 ml    IV PiggyBack: 250 ml    Total IN: 2970 ml  ---------------------------------------------  OUT:    Voided: 1775 ml    Indwelling Catheter - Urethral: 1000 ml    Bulb: 60 ml    Bulb: 30 ml    Total OUT: 2865 ml  ---------------------------------------------  Total NET: 105 ml    I & Os for current day (as of 03-03 @ 09:51)  =============================================  IN:    sodium chloride 0.9%: 200 ml    Osmolite: 190 ml    Total IN: 390 ml  ---------------------------------------------  OUT:    Total OUT: 0 ml  ---------------------------------------------  Total NET: 390 ml        PHYSICAL EXAM:    AFVSS, NAD  Arterial and venous doppler signals normal  OC/OP: resection incision line intact  Right neck: flat, incision c/d/i, implanted doppler wires secured  Left lower leg in splint, MARCO A with serosang output  NGT sutured to membranous septum. L nasal ala erythema minimal,decreased  LABS:  CBC-                        12.0   16.5  )-----------( 214      ( 03 Mar 2017 06:50 )             35.3     BMP/CMP-  03 Mar 2017 06:49    139    |  107    |  14     ----------------------------<  117    4.0     |  25     |  0.56     Ca    8.7        03 Mar 2017 06:49  Phos  2.2       03 Mar 2017 06:49  Mg     2.4       03 Mar 2017 06:49      Coagulation Studies-  PT/INR - ( 01 Mar 2017 17:53 )   PT: 13.7 sec;   INR: 1.23          PTT - ( 01 Mar 2017 17:53 )  PTT:29.9 sec  Endocrine Panel-  Calcium, Total Serum: 8.7 mg/dL (03-03 @ 06:49)              RADIOLOGY & ADDITIONAL STUDIES:      Assessment/Plan: 47y Male POD#2 s/p R segmental mandibulectomy, L fibula MVFF recon, R neck exploration.   - cont TF  - Patient should be normotensive. No pressors. Please give fluid boluses only if hypotensive. SBP < 160  - HOB elevated, no circumferential neck ties or pressure on right neck. Neck should be straight in neutral position or slight flexion  - Clindamycin  - Decadron taper  - OOB to chair with leg elevated when OOB, can be partial weight bearing to L leg with splint  - oral rinses start to start today  - bowel regimen  - No ASA per Dr. Lerman  - SDU  - f/u PT      - d/w attending MD Elan Lawler whom agrees with the above plan      PPX: SCDs, DVT ppx with Lovenox per Dr. Lerman    Dispo planning:   -Home care TBD by PT needs

## 2017-03-03 NOTE — PHYSICAL THERAPY INITIAL EVALUATION ADULT - DISCHARGE DISPOSITION, PT EVAL
Home, no PT needs (educated patient to follow up with outpatient PT once (L)LE is medically cleared).

## 2017-03-03 NOTE — PHYSICAL THERAPY INITIAL EVALUATION ADULT - PERTINENT HX OF CURRENT PROBLEM, REHAB EVAL
47 M found by dentist to have R mandibular body mass on x-ray after having discomfort for several months. 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 Admitted today for right segmental mandibulectomy, fibula free flap recon, and neck exploration. Please refer to H&P on [place] for remaining.

## 2017-03-03 NOTE — PHYSICAL THERAPY INITIAL EVALUATION ADULT - MANUAL MUSCLE TESTING RESULTS, REHAB EVAL
Grossly assessed with functional movement, bilateral UE and (R) greater than or equal to 3+/5. (L)LE not assessed secondary to surgical leg however patient able to lift LE against gravity with bed mobility/functional transfers/ambulation.

## 2017-03-03 NOTE — PHYSICAL THERAPY INITIAL EVALUATION ADULT - ACTIVE RANGE OF MOTION EXAMINATION, REHAB EVAL
(L)ankle ROM not assessed secondary to splinting/bilateral  lower extremity Active ROM was WFL (within functional limits)/bilateral upper extremity Active ROM was WFL (within functional limits)

## 2017-03-03 NOTE — PROGRESS NOTE ADULT - SUBJECTIVE AND OBJECTIVE BOX
SUBJECTIVE:  Doing well.   No overnight events.     OBJECTIVE:     ** VITAL SIGNS / I&O's **    ICU Vital Signs Last 24 Hrs  T(C): 36.7, Max: 37.5 (03-02 @ 18:10)  T(F): 98, Max: 99.5 (03-02 @ 18:10)  HR: 50 (48 - 76)  BP: 110/53 (97/44 - 137/64)  BP(mean): 74 (64 - 93)  ABP: 144/84 (86/62 - 152/90)  ABP(mean): 108 (76 - 108)  RR: 13 (6 - 20)  SpO2: 98% (96% - 100%)        I & Os for current day (as of 03 Mar 2017 07:57)  =============================================  IN:    sodium chloride 0.9%.: 2050 ml    Osmolite: 560 ml    IV PiggyBack: 250 ml    Total IN: 2860 ml  ---------------------------------------------  OUT:    Voided: 1550 ml    Indwelling Catheter - Urethral: 1000 ml    Bulb: 60 ml    Bulb: 30 ml    Total OUT: 2640 ml  ---------------------------------------------  Total NET: 220 ml      ** PHYSICAL EXAM **    -- CONSTITUTIONAL: awake, alert NAD  -- FLAP: Soft and pink with 2-3 second capillary refill. (+) arterial and venous Doppler signal, MARCO A serosanguinous  -- NECK: incision c/d/i, MARCO A serosanguinous  -- CARDIOVASCULAR: Regular rate and rhythm. S1, S2.  -- RESPIRATORY: unlabored breathing  -- EXTREMITIES: LLE ACE wrap c/d/i, splint in place      ** LABS **                           12.0   16.5  )-----------( 214      ( 03 Mar 2017 06:50 )             35.3     03 Mar 2017 06:49    139    |  107    |  14     ----------------------------<  117    4.0     |  25     |  0.56     Ca    8.7        03 Mar 2017 06:49  Phos  2.2       03 Mar 2017 06:49  Mg     2.4       03 Mar 2017 06:49      PT/INR - ( 01 Mar 2017 17:53 )   PT: 13.7 sec;   INR: 1.23          PTT - ( 01 Mar 2017 17:53 )  PTT:29.9 sec  CAPILLARY BLOOD GLUCOSE  114 (02 Mar 2017 21:00)

## 2017-03-04 LAB
ANION GAP SERPL CALC-SCNC: 9 MMOL/L — SIGNIFICANT CHANGE UP (ref 9–16)
BUN SERPL-MCNC: 18 MG/DL — SIGNIFICANT CHANGE UP (ref 7–23)
CALCIUM SERPL-MCNC: 9 MG/DL — SIGNIFICANT CHANGE UP (ref 8.5–10.5)
CHLORIDE SERPL-SCNC: 106 MMOL/L — SIGNIFICANT CHANGE UP (ref 96–108)
CO2 SERPL-SCNC: 26 MMOL/L — SIGNIFICANT CHANGE UP (ref 22–31)
CREAT SERPL-MCNC: 0.73 MG/DL — SIGNIFICANT CHANGE UP (ref 0.5–1.3)
GLUCOSE SERPL-MCNC: 111 MG/DL — HIGH (ref 70–99)
HCT VFR BLD CALC: 31.7 % — LOW (ref 39–50)
HGB BLD-MCNC: 10.6 G/DL — LOW (ref 13–17)
MAGNESIUM SERPL-MCNC: 2.5 MG/DL — HIGH (ref 1.6–2.4)
MCHC RBC-ENTMCNC: 29.9 PG — SIGNIFICANT CHANGE UP (ref 27–34)
MCHC RBC-ENTMCNC: 33.4 G/DL — SIGNIFICANT CHANGE UP (ref 32–36)
MCV RBC AUTO: 89.5 FL — SIGNIFICANT CHANGE UP (ref 80–100)
PHOSPHATE SERPL-MCNC: 3.2 MG/DL — SIGNIFICANT CHANGE UP (ref 2.5–4.5)
PLATELET # BLD AUTO: 214 K/UL — SIGNIFICANT CHANGE UP (ref 150–400)
POTASSIUM SERPL-MCNC: 4.4 MMOL/L — SIGNIFICANT CHANGE UP (ref 3.5–5.3)
POTASSIUM SERPL-SCNC: 4.4 MMOL/L — SIGNIFICANT CHANGE UP (ref 3.5–5.3)
RBC # BLD: 3.54 M/UL — LOW (ref 4.2–5.8)
RBC # FLD: 13.1 % — SIGNIFICANT CHANGE UP (ref 10.3–16.9)
SODIUM SERPL-SCNC: 141 MMOL/L — SIGNIFICANT CHANGE UP (ref 135–145)
WBC # BLD: 13.6 K/UL — HIGH (ref 3.8–10.5)
WBC # FLD AUTO: 13.6 K/UL — HIGH (ref 3.8–10.5)

## 2017-03-04 RX ADMIN — Medication 100 MILLIGRAM(S): at 19:16

## 2017-03-04 RX ADMIN — ENOXAPARIN SODIUM 40 MILLIGRAM(S): 100 INJECTION SUBCUTANEOUS at 13:02

## 2017-03-04 RX ADMIN — Medication 20 MILLILITER(S): at 12:19

## 2017-03-04 RX ADMIN — Medication 4 MILLIGRAM(S): at 19:16

## 2017-03-04 RX ADMIN — Medication 4 MILLIGRAM(S): at 07:23

## 2017-03-04 RX ADMIN — Medication 100 MILLIGRAM(S): at 02:38

## 2017-03-04 RX ADMIN — Medication 20 MILLILITER(S): at 02:43

## 2017-03-04 RX ADMIN — Medication 20 MILLILITER(S): at 22:22

## 2017-03-04 RX ADMIN — LOSARTAN POTASSIUM 50 MILLIGRAM(S): 100 TABLET, FILM COATED ORAL at 07:23

## 2017-03-04 RX ADMIN — Medication 20 MILLILITER(S): at 18:00

## 2017-03-04 RX ADMIN — Medication 100 MILLIGRAM(S): at 09:47

## 2017-03-04 RX ADMIN — Medication 20 MILLILITER(S): at 07:27

## 2017-03-04 NOTE — PROGRESS NOTE ADULT - SUBJECTIVE AND OBJECTIVE BOX
Seen and examined this AM.  No acute events ovnt.  Has been OOB to chair.  Mild discomfort right neck.    Exam:  AFVSS  MARCO A Neck 70 cc Leg 30 cc serosang  right neck moderate swelling.  TTP.  Arterial  signal good.  Venous signal with moderate background.  Left leg incision c/d/i.

## 2017-03-04 NOTE — PROGRESS NOTE ADULT - SUBJECTIVE AND OBJECTIVE BOX
ENT [facility] DAILY PROGRESS NOTE    Interval HPI: 47y Male s/p R segmental mandibulectomy and R neck exploration with fibula free flap reconstruction on 3/1/17.   POD 0: H/H stable. Flap well perfused.   POD 1- no acute events overnight, Pain controlled. Breathing comfortably. No N/V. Flap well perfused, NGT advanced.   POD2: Flap well perfused. tolerating TF. tx to SDU    Overnight events POD 3: CLEMENCIA overnight. Pain controlled. denies N/V. progressing well.         Allergies  penicillins (Hives)      MEDICATIONS:  Antiinfectives:   clindamycin IVPB 600milliGRAM(s) IV Intermittent every 8 hours    IV fluids:    Hematologic/Anticoagulation:  enoxaparin Injectable 40milliGRAM(s) SubCutaneous daily    Pain medications/Neuro:  ondansetron Injectable 4milliGRAM(s) IV Push every 6 hours PRN  morphine  - Injectable 2milliGRAM(s) IV Push every 4 hours PRN  ALPRAZolam 0.25milliGRAM(s) Oral at bedtime PRN    Endocrine Medications:   dexamethasone  Injectable 4milliGRAM(s) IV Push every 12 hours    All other standing medications:   losartan 50milliGRAM(s) Oral daily  sodium bicarbonate Mouth Rinse 20milliLiter(s) Swish and Spit every 4 hours  magnesium hydroxide Suspension 30milliLiter(s) Oral daily  senna 2Tablet(s) Oral at bedtime    All other PRN medications:  labetalol Injectable 10milliGRAM(s) IV Push every 6 hours PRN      Vital Signs Last 24 Hrs  T(C): 37.1, Max: 37.1 (03-04 @ 09:26)  T(F): 98.7, Max: 98.7 (03-04 @ 09:26)  HR: 58 (48 - 64)  BP: 121/69 (119/56 - 138/78)  BP(mean): 87 (79 - 99)  RR: 16 (15 - 18)  SpO2: 97% (96% - 99%)    I & Os for 24h ending 03-04 @ 07:00  =============================================  IN:    Osmolite: 930 ml    sodium chloride 0.9%: 200 ml    Solution: 126 ml    IV PiggyBack: 100 ml    Total IN: 1356 ml  ---------------------------------------------  OUT:    Voided: 1125 ml    Bulb: 70 ml    Bulb: 30 ml    Total OUT: 1225 ml  ---------------------------------------------  Total NET: 131 ml    I & Os for current day (as of 03-04 @ 13:50)  =============================================  IN:    Total IN: 0 ml  ---------------------------------------------  OUT:    Voided: 300 ml    Bulb: 10 ml    Total OUT: 310 ml  ---------------------------------------------  Total NET: -310 ml        PHYSICAL EXAM:    AFVSS, NAD  Arterial and venous doppler signals normal  OC/OP: resection incision line intact  Right neck: flat, incision c/d/i, implanted doppler wires secured  Left lower leg in splint, MARCO A with serosang output  NGT sutured to membranous septum. L nasal ala erythema minimal, decreased    LABS:  CBC-                        10.6   13.6  )-----------( 214      ( 04 Mar 2017 06:16 )             31.7     BMP/CMP-  04 Mar 2017 06:16    141    |  106    |  18     ----------------------------<  111    4.4     |  26     |  0.73     Ca    9.0        04 Mar 2017 06:16  Phos  3.2       04 Mar 2017 06:16  Mg     2.5       04 Mar 2017 06:16      Coagulation Studies-    Endocrine Panel-  Calcium, Total Serum: 9.0 mg/dL (03-04 @ 06:16)      Assessment/Plan:  47y Male POD#3 s/p R segmental mandibulectomy, L fibula MVFF recon, R neck exploration.   - cont TF  - HOB elevated, no circumferential neck ties or pressure on right neck. Neck should be straight in neutral position or slight flexion  - Clindamycin  - Decadron taper  - OOB to chair with leg elevated when OOB, can be partial weight bearing to L leg with splint  - oral rinses  - bowel regimen  - No ASA per Dr. Lerman  - SDU  - f/u PT      - d/w attending MD Elan Lawler whom agrees with the above plan      PPX: SCDs, DVT ppx with Lovenox per Dr. Lerman    Dispo planning:   -Home care TBD by PT needs

## 2017-03-05 RX ORDER — DEXAMETHASONE 0.5 MG/5ML
2 ELIXIR ORAL
Qty: 0 | Refills: 0 | Status: DISCONTINUED | OUTPATIENT
Start: 2017-03-05 | End: 2017-03-07

## 2017-03-05 RX ADMIN — Medication 20 MILLILITER(S): at 11:23

## 2017-03-05 RX ADMIN — Medication 100 MILLIGRAM(S): at 10:21

## 2017-03-05 RX ADMIN — Medication 2 MILLIGRAM(S): at 18:51

## 2017-03-05 RX ADMIN — Medication 20 MILLILITER(S): at 03:41

## 2017-03-05 RX ADMIN — Medication 100 MILLIGRAM(S): at 02:55

## 2017-03-05 RX ADMIN — LOSARTAN POTASSIUM 50 MILLIGRAM(S): 100 TABLET, FILM COATED ORAL at 07:22

## 2017-03-05 RX ADMIN — Medication 4 MILLIGRAM(S): at 07:23

## 2017-03-05 RX ADMIN — Medication 20 MILLILITER(S): at 21:37

## 2017-03-05 RX ADMIN — Medication 20 MILLILITER(S): at 18:28

## 2017-03-05 RX ADMIN — Medication 20 MILLILITER(S): at 14:41

## 2017-03-05 RX ADMIN — Medication 20 MILLILITER(S): at 07:23

## 2017-03-05 RX ADMIN — ENOXAPARIN SODIUM 40 MILLIGRAM(S): 100 INJECTION SUBCUTANEOUS at 14:18

## 2017-03-05 RX ADMIN — Medication 100 MILLIGRAM(S): at 18:51

## 2017-03-05 NOTE — PROGRESS NOTE ADULT - SUBJECTIVE AND OBJECTIVE BOX
Seen and examined this AM.  No acute events ovnt.  Has been ambulating with assist.  Mild discomfort right neck.  Doppoff feeding tube not functioning.    Exam:  AFVSS  MARCO A Neck 120 cc Leg 40 cc serosang  right neck moderate swelling.  TTP.  Arterial  signal good.  Venous signal with moderate background.  Left leg incision c/d/i.

## 2017-03-05 NOTE — PROGRESS NOTE ADULT - SUBJECTIVE AND OBJECTIVE BOX
ENT [facility] DAILY PROGRESS NOTE    Overnight events/Interval HPI:  47y Male s/p R segmental mandibulectomy and R neck exploration with fibula free flap reconstruction on 3/1/17.   POD 0: H/H stable. Flap well perfused.   POD 1- no acute events overnight, Pain controlled. Breathing comfortably. No N/V. Flap well perfused, NGT advanced.   POD2: Flap well perfused. tolerating TF. tx to SDU  POD 3: CLEMENCIA overnight. Pain controlled. denies N/V. progressing well.     Overnight events: CLEMENCIA. progressing well. Started on diet this AM without issue. NGT removed. Likely ready for discharge tomorrow          Allergies    penicillins (Hives)    Intolerances        MEDICATIONS:  Antiinfectives:   clindamycin IVPB 600milliGRAM(s) IV Intermittent every 8 hours    IV fluids:    Hematologic/Anticoagulation:  enoxaparin Injectable 40milliGRAM(s) SubCutaneous daily    Pain medications/Neuro:  ondansetron Injectable 4milliGRAM(s) IV Push every 6 hours PRN  morphine  - Injectable 2milliGRAM(s) IV Push every 4 hours PRN  ALPRAZolam 0.25milliGRAM(s) Oral at bedtime PRN    Endocrine Medications:   dexamethasone  Injectable 2milliGRAM(s) IV Push two times a day    All other standing medications:   losartan 50milliGRAM(s) Oral daily  sodium bicarbonate Mouth Rinse 20milliLiter(s) Swish and Spit every 4 hours  magnesium hydroxide Suspension 30milliLiter(s) Oral daily  senna 2Tablet(s) Oral at bedtime    All other PRN medications:  labetalol Injectable 10milliGRAM(s) IV Push every 6 hours PRN      Vital Signs Last 24 Hrs  T(C): 36.2, Max: 36.7 (03-04 @ 21:23)  T(F): 97.1, Max: 98.1 (03-04 @ 21:23)  HR: 51 (51 - 55)  BP: 131/73 (124/61 - 142/79)  BP(mean): 95 (87 - 105)  RR: 16 (16 - 16)  SpO2: 97% (97% - 98%)    I & Os for 24h ending 03-05 @ 07:00  =============================================  IN:    Osmolite: 780 ml    Total IN: 780 ml  ---------------------------------------------  OUT:    Voided: 1150 ml    Bulb: 120 ml    Bulb: 40 ml    Total OUT: 1310 ml  ---------------------------------------------  Total NET: -530 ml    I & Os for current day (as of 03-05 @ 19:08)  =============================================  IN:    Total IN: 0 ml  ---------------------------------------------  OUT:    Voided: 650 ml    Bulb: 120 ml    Bulb: 10 ml    Total OUT: 780 ml  ---------------------------------------------  Total NET: -780 ml        PHYSICAL EXAM:    AFVSS, NAD  Arterial and venous doppler signals normal  OC/OP: resection incision line intact  Right neck: flat, incision c/d/i, implanted doppler wires secured  Left lower leg in splint, MARCO A with serosang output  NGT removed    LABS:  CBC-                        10.6   13.6  )-----------( 214      ( 04 Mar 2017 06:16 )             31.7     BMP/CMP-  04 Mar 2017 06:16    141    |  106    |  18     ----------------------------<  111    4.4     |  26     |  0.73     Ca    9.0        04 Mar 2017 06:16  Phos  3.2       04 Mar 2017 06:16  Mg     2.5       04 Mar 2017 06:16      Coagulation Studies-    Endocrine Panel-              RADIOLOGY & ADDITIONAL STUDIES:      Assessment/Plan:  47y Male POD#3 s/p R segmental mandibulectomy, L fibula MVFF recon, R neck exploration.   - cont TF  - HOB elevated, no circumferential neck ties or pressure on right neck. Neck should be straight in neutral position or slight flexion  - Clindamycin  - Decadron taper  - OOB to chair with leg elevated when OOB, can be partial weight bearing to L leg with splint  - oral rinses  - bowel regimen  - No ASA per Dr. Lerman  - SDU  - f/u PT  - likely home tomorrow  - NGT removed  - FLD      - d/w attending MD Elan Lawler whom agrees with the above plan      PPX: SCDs, DVT ppx with Lovenox per Dr. Lerman    Dispo planning:   -Home care TBD by PT needs

## 2017-03-06 ENCOUNTER — RESULT REVIEW (OUTPATIENT)
Age: 47
End: 2017-03-06

## 2017-03-06 LAB
ANION GAP SERPL CALC-SCNC: 10 MMOL/L — SIGNIFICANT CHANGE UP (ref 9–16)
BUN SERPL-MCNC: 20 MG/DL — SIGNIFICANT CHANGE UP (ref 7–23)
CALCIUM SERPL-MCNC: 9 MG/DL — SIGNIFICANT CHANGE UP (ref 8.5–10.5)
CHLORIDE SERPL-SCNC: 100 MMOL/L — SIGNIFICANT CHANGE UP (ref 96–108)
CO2 SERPL-SCNC: 27 MMOL/L — SIGNIFICANT CHANGE UP (ref 22–31)
CREAT SERPL-MCNC: 0.64 MG/DL — SIGNIFICANT CHANGE UP (ref 0.5–1.3)
GLUCOSE SERPL-MCNC: 93 MG/DL — SIGNIFICANT CHANGE UP (ref 70–99)
HCT VFR BLD CALC: 36.4 % — LOW (ref 39–50)
HGB BLD-MCNC: 12.4 G/DL — LOW (ref 13–17)
MAGNESIUM SERPL-MCNC: 2.4 MG/DL — SIGNIFICANT CHANGE UP (ref 1.6–2.4)
MCHC RBC-ENTMCNC: 30.2 PG — SIGNIFICANT CHANGE UP (ref 27–34)
MCHC RBC-ENTMCNC: 34.1 G/DL — SIGNIFICANT CHANGE UP (ref 32–36)
MCV RBC AUTO: 88.8 FL — SIGNIFICANT CHANGE UP (ref 80–100)
PHOSPHATE SERPL-MCNC: 3.6 MG/DL — SIGNIFICANT CHANGE UP (ref 2.5–4.5)
PLATELET # BLD AUTO: 307 K/UL — SIGNIFICANT CHANGE UP (ref 150–400)
POTASSIUM SERPL-MCNC: 4.4 MMOL/L — SIGNIFICANT CHANGE UP (ref 3.5–5.3)
POTASSIUM SERPL-SCNC: 4.4 MMOL/L — SIGNIFICANT CHANGE UP (ref 3.5–5.3)
RBC # BLD: 4.1 M/UL — LOW (ref 4.2–5.8)
RBC # FLD: 12.8 % — SIGNIFICANT CHANGE UP (ref 10.3–16.9)
SODIUM SERPL-SCNC: 137 MMOL/L — SIGNIFICANT CHANGE UP (ref 135–145)
SURGICAL PATHOLOGY STUDY: SIGNIFICANT CHANGE UP
WBC # BLD: 12.3 K/UL — HIGH (ref 3.8–10.5)
WBC # FLD AUTO: 12.3 K/UL — HIGH (ref 3.8–10.5)

## 2017-03-06 PROCEDURE — 71010: CPT | Mod: 26

## 2017-03-06 PROCEDURE — 99253 IP/OBS CNSLTJ NEW/EST LOW 45: CPT | Mod: GC

## 2017-03-06 RX ORDER — CEFTRIAXONE 500 MG/1
1 INJECTION, POWDER, FOR SOLUTION INTRAMUSCULAR; INTRAVENOUS ONCE
Qty: 0 | Refills: 0 | Status: COMPLETED | OUTPATIENT
Start: 2017-03-06 | End: 2017-03-06

## 2017-03-06 RX ORDER — VANCOMYCIN HCL 1 G
1000 VIAL (EA) INTRAVENOUS EVERY 12 HOURS
Qty: 0 | Refills: 0 | Status: DISCONTINUED | OUTPATIENT
Start: 2017-03-06 | End: 2017-03-08

## 2017-03-06 RX ORDER — CEFTRIAXONE 500 MG/1
INJECTION, POWDER, FOR SOLUTION INTRAMUSCULAR; INTRAVENOUS
Qty: 0 | Refills: 0 | Status: DISCONTINUED | OUTPATIENT
Start: 2017-03-06 | End: 2017-03-08

## 2017-03-06 RX ORDER — ACETAMINOPHEN 500 MG
650 TABLET ORAL EVERY 6 HOURS
Qty: 0 | Refills: 0 | Status: DISCONTINUED | OUTPATIENT
Start: 2017-03-06 | End: 2017-03-07

## 2017-03-06 RX ORDER — CEFTRIAXONE 500 MG/1
1 INJECTION, POWDER, FOR SOLUTION INTRAMUSCULAR; INTRAVENOUS EVERY 24 HOURS
Qty: 0 | Refills: 0 | Status: DISCONTINUED | OUTPATIENT
Start: 2017-03-07 | End: 2017-03-08

## 2017-03-06 RX ADMIN — Medication 2 MILLIGRAM(S): at 05:09

## 2017-03-06 RX ADMIN — Medication 100 MILLIGRAM(S): at 10:30

## 2017-03-06 RX ADMIN — LOSARTAN POTASSIUM 50 MILLIGRAM(S): 100 TABLET, FILM COATED ORAL at 05:09

## 2017-03-06 RX ADMIN — ENOXAPARIN SODIUM 40 MILLIGRAM(S): 100 INJECTION SUBCUTANEOUS at 12:41

## 2017-03-06 RX ADMIN — Medication 100 MILLIGRAM(S): at 17:39

## 2017-03-06 RX ADMIN — Medication 250 MILLIGRAM(S): at 21:21

## 2017-03-06 RX ADMIN — Medication 650 MILLIGRAM(S): at 05:00

## 2017-03-06 RX ADMIN — Medication 20 MILLILITER(S): at 17:45

## 2017-03-06 RX ADMIN — Medication 20 MILLILITER(S): at 21:22

## 2017-03-06 RX ADMIN — CEFTRIAXONE 100 GRAM(S): 500 INJECTION, POWDER, FOR SOLUTION INTRAMUSCULAR; INTRAVENOUS at 19:15

## 2017-03-06 RX ADMIN — Medication 20 MILLILITER(S): at 10:30

## 2017-03-06 RX ADMIN — Medication 100 MILLIGRAM(S): at 02:00

## 2017-03-06 RX ADMIN — Medication 20 MILLILITER(S): at 03:52

## 2017-03-06 RX ADMIN — Medication 2 MILLIGRAM(S): at 19:15

## 2017-03-06 RX ADMIN — Medication 650 MILLIGRAM(S): at 06:00

## 2017-03-06 RX ADMIN — Medication 20 MILLILITER(S): at 14:30

## 2017-03-06 RX ADMIN — Medication 20 MILLILITER(S): at 05:09

## 2017-03-06 NOTE — PROGRESS NOTE ADULT - SUBJECTIVE AND OBJECTIVE BOX
Seen and examined this AM.  No acute events ovnt.  Has been ambulating with assist.  Mild discomfort right neck.  Was started on liquid diet now with higher output from his neck MARCO A.    Exam:  AFVSS  MARCO A Neck 220 cc Dark cloudy fluid Leg 25 cc serosang  right neck moderate swelling.  TTP.  Arterial  signal good.  Venous signal with moderate background.  Left leg incision c/d/i.

## 2017-03-06 NOTE — CONSULT NOTE ADULT - ASSESSMENT
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  Agree with need to broaden coverage for more of oral wilner.  Suggest:  -Continue clindamycin 600 mg q6h  -Vancomycin 1 g iv q12 h - check trough before 4th dose, goal is 10-20  -Ceftriaxone 1 g iv q24 h  -Would continue antibiotics until NGT has been removed and he is successfully taking po.  Recommendations discussed with primary team.  Will follow with you.

## 2017-03-06 NOTE — PROGRESS NOTE ADULT - SUBJECTIVE AND OBJECTIVE BOX
ENT [facility] DAILY PROGRESS NOTE    Overnight events/Interval HPI:  47y Male s/p R segmental mandibulectomy and R neck exploration with fibula free flap reconstruction on 3/1/17.     CLEMENCIA. Started on diet yest however, noted incr in drain output. NGT replaced.  broaden abx         Allergies    penicillins (Hives)    Intolerances        MEDICATIONS:  Antiinfectives:   clindamycin IVPB 600milliGRAM(s) IV Intermittent every 8 hours  vancomycin  IVPB 1000milliGRAM(s) IV Intermittent every 12 hours  cefTRIAXone   IVPB  IV Intermittent     IV fluids:    Hematologic/Anticoagulation:  enoxaparin Injectable 40milliGRAM(s) SubCutaneous daily    Pain medications/Neuro:  ondansetron Injectable 4milliGRAM(s) IV Push every 6 hours PRN  ALPRAZolam 0.25milliGRAM(s) Oral at bedtime PRN  acetaminophen   Tablet. 650milliGRAM(s) Oral every 6 hours PRN  oxyCODONE  5 mG/acetaminophen 325 mG 1Tablet(s) Oral every 4 hours PRN  oxyCODONE  5 mG/acetaminophen 325 mG 2Tablet(s) Oral every 6 hours PRN    Endocrine Medications:   dexamethasone  Injectable 2milliGRAM(s) IV Push two times a day    All other standing medications:   losartan 50milliGRAM(s) Oral daily  sodium bicarbonate Mouth Rinse 20milliLiter(s) Swish and Spit every 4 hours  magnesium hydroxide Suspension 30milliLiter(s) Oral daily  senna 2Tablet(s) Oral at bedtime    All other PRN medications:  labetalol Injectable 10milliGRAM(s) IV Push every 6 hours PRN      Vital Signs Last 24 Hrs  T(C): 37.2, Max: 37.2 (03-06 @ 16:50)  T(F): 98.9, Max: 98.9 (03-06 @ 16:50)  HR: 61 (49 - 61)  BP: 131/82 (116/66 - 164/85)  BP(mean): 103 (90 - 118)  RR: 16 (16 - 17)  SpO2: 99% (97% - 100%)    I & Os for 24h ending 03-06 @ 07:00  =============================================  IN:    IV PiggyBack: 50 ml    Total IN: 50 ml  ---------------------------------------------  OUT:    Voided: 1550 ml    Bulb: 220 ml    Bulb: 25 ml    Total OUT: 1795 ml  ---------------------------------------------  Total NET: -1745 ml    I & Os for current day (as of 03-06 @ 19:38)  =============================================  IN:    Osmolite: 120 ml    IV PiggyBack: 50 ml    Total IN: 170 ml  ---------------------------------------------  OUT:    Voided: 150 ml    Bulb: 38 ml    Bulb: 10 ml    Total OUT: 198 ml  ---------------------------------------------  Total NET: -28 ml        PHYSICAL EXAM:  AFVSS, NAD  ambulating   Arterial and venous doppler signals normal  OC/OP: resection incision line intact  Right neck: flat, incision c/d/i, implanted doppler wires secured  Left lower leg in cam boot, MARCO A with serosang output  NGT replaced    LABS:  CBC-                        12.4   12.3  )-----------( 307      ( 06 Mar 2017 07:14 )             36.4     BMP/CMP-  06 Mar 2017 07:14    137    |  100    |  20     ----------------------------<  93     4.4     |  27     |  0.64     Ca    9.0        06 Mar 2017 07:14  Phos  3.6       06 Mar 2017 07:14  Mg     2.4       06 Mar 2017 07:14      Coagulation Studies-    Endocrine Panel-  Calcium, Total Serum: 9.0 mg/dL (03-06 @ 07:14)              RADIOLOGY & ADDITIONAL STUDIES:      Assessment/Plan:  47y Male s/p R segmental mandibulectomy, L fibula MVFF recon, R neck exploration now with concern for fistula.  - cont TF  - HOB elevated, no circumferential neck ties or pressure on right neck. Neck should be straight in neutral position or slight flexion  - abx per ID  - Decadron taper  - OOB to chair with leg elevated when OOB, can be partial weight bearing to L leg with splint  - oral rinses  - bowel regimen  - No ASA per Dr. Lerman  - regional room  - f/u PT  - NPO    - seen by and d/w attending MD Elan Lawler whom agrees with the above plan      PPX: SCDs, DVT ppx with Lovenox per Dr. Lerman    Dispo planning:   -Home care TBD by PT needs

## 2017-03-07 DIAGNOSIS — D16.5 BENIGN NEOPLASM OF LOWER JAW BONE: ICD-10-CM

## 2017-03-07 LAB
HCT VFR BLD CALC: 38.7 % — LOW (ref 39–50)
HGB BLD-MCNC: 13.4 G/DL — SIGNIFICANT CHANGE UP (ref 13–17)
MCHC RBC-ENTMCNC: 30.2 PG — SIGNIFICANT CHANGE UP (ref 27–34)
MCHC RBC-ENTMCNC: 34.6 G/DL — SIGNIFICANT CHANGE UP (ref 32–36)
MCV RBC AUTO: 87.4 FL — SIGNIFICANT CHANGE UP (ref 80–100)
PLATELET # BLD AUTO: 358 K/UL — SIGNIFICANT CHANGE UP (ref 150–400)
RBC # BLD: 4.43 M/UL — SIGNIFICANT CHANGE UP (ref 4.2–5.8)
RBC # FLD: 12.7 % — SIGNIFICANT CHANGE UP (ref 10.3–16.9)
WBC # BLD: 14.2 K/UL — HIGH (ref 3.8–10.5)
WBC # FLD AUTO: 14.2 K/UL — HIGH (ref 3.8–10.5)

## 2017-03-07 PROCEDURE — 40845 RECONSTRUCTION OF MOUTH: CPT | Mod: 76,78

## 2017-03-07 PROCEDURE — 35800 EXPLORE NECK VESSELS: CPT | Mod: 78

## 2017-03-07 RX ORDER — DIPHENHYDRAMINE HCL 50 MG
50 CAPSULE ORAL AT BEDTIME
Qty: 0 | Refills: 0 | Status: DISCONTINUED | OUTPATIENT
Start: 2017-03-07 | End: 2017-03-07

## 2017-03-07 RX ORDER — HYDROMORPHONE HYDROCHLORIDE 2 MG/ML
0.5 INJECTION INTRAMUSCULAR; INTRAVENOUS; SUBCUTANEOUS
Qty: 0 | Refills: 0 | Status: DISCONTINUED | OUTPATIENT
Start: 2017-03-07 | End: 2017-03-07

## 2017-03-07 RX ORDER — SODIUM CHLORIDE 9 MG/ML
1000 INJECTION, SOLUTION INTRAVENOUS
Qty: 0 | Refills: 0 | Status: DISCONTINUED | OUTPATIENT
Start: 2017-03-07 | End: 2017-03-08

## 2017-03-07 RX ORDER — METOCLOPRAMIDE HCL 10 MG
10 TABLET ORAL ONCE
Qty: 0 | Refills: 0 | Status: DISCONTINUED | OUTPATIENT
Start: 2017-03-07 | End: 2017-03-09

## 2017-03-07 RX ORDER — HYDROMORPHONE HYDROCHLORIDE 2 MG/ML
1 INJECTION INTRAMUSCULAR; INTRAVENOUS; SUBCUTANEOUS EVERY 4 HOURS
Qty: 0 | Refills: 0 | Status: DISCONTINUED | OUTPATIENT
Start: 2017-03-07 | End: 2017-03-09

## 2017-03-07 RX ORDER — ACETAMINOPHEN 500 MG
1000 TABLET ORAL ONCE
Qty: 0 | Refills: 0 | Status: DISCONTINUED | OUTPATIENT
Start: 2017-03-07 | End: 2017-03-09

## 2017-03-07 RX ADMIN — Medication 100 MILLIGRAM(S): at 12:56

## 2017-03-07 RX ADMIN — Medication 20 MILLILITER(S): at 01:05

## 2017-03-07 RX ADMIN — Medication 20 MILLILITER(S): at 22:59

## 2017-03-07 RX ADMIN — HYDROMORPHONE HYDROCHLORIDE 0.5 MILLIGRAM(S): 2 INJECTION INTRAMUSCULAR; INTRAVENOUS; SUBCUTANEOUS at 20:38

## 2017-03-07 RX ADMIN — Medication 100 MILLIGRAM(S): at 01:05

## 2017-03-07 RX ADMIN — Medication 20 MILLILITER(S): at 10:12

## 2017-03-07 RX ADMIN — Medication 2 MILLIGRAM(S): at 07:03

## 2017-03-07 RX ADMIN — ENOXAPARIN SODIUM 40 MILLIGRAM(S): 100 INJECTION SUBCUTANEOUS at 12:57

## 2017-03-07 RX ADMIN — Medication 0.25 MILLIGRAM(S): at 01:15

## 2017-03-07 RX ADMIN — LOSARTAN POTASSIUM 50 MILLIGRAM(S): 100 TABLET, FILM COATED ORAL at 07:03

## 2017-03-07 RX ADMIN — Medication 20 MILLILITER(S): at 14:00

## 2017-03-07 RX ADMIN — HYDROMORPHONE HYDROCHLORIDE 0.5 MILLIGRAM(S): 2 INJECTION INTRAMUSCULAR; INTRAVENOUS; SUBCUTANEOUS at 21:03

## 2017-03-07 RX ADMIN — Medication 250 MILLIGRAM(S): at 09:00

## 2017-03-07 RX ADMIN — CEFTRIAXONE 100 GRAM(S): 500 INJECTION, POWDER, FOR SOLUTION INTRAMUSCULAR; INTRAVENOUS at 20:54

## 2017-03-07 RX ADMIN — Medication 100 MILLIGRAM(S): at 20:21

## 2017-03-07 RX ADMIN — Medication 250 MILLIGRAM(S): at 22:02

## 2017-03-07 RX ADMIN — Medication 20 MILLILITER(S): at 07:03

## 2017-03-07 NOTE — BRIEF OPERATIVE NOTE - COMMENTS
The patient tolerated the procedure well. There were no complications. The patient was extubated in the OR. The patient was transferred to the PACU in stable condition.

## 2017-03-07 NOTE — BRIEF OPERATIVE NOTE - OPERATION/FINDINGS
see dictation
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

## 2017-03-07 NOTE — PROGRESS NOTE ADULT - SUBJECTIVE AND OBJECTIVE BOX
SUBJECTIVE:  Doing well.   No overnight events.     OBJECTIVE:     ** VITAL SIGNS / I&O's **    T(C): 36.5, Max: 37.2 (03-06 @ 16:50)  T(F): 97.7, Max: 98.9 (03-06 @ 16:50)  HR: 59 (59 - 63)  BP: 142/81 (116/66 - 142/81)  RR: 16 (16 - 17)  SpO2: 98% (96% - 99%)  Wt(kg): --    I & Os for 24h ending 06 Mar 2017 07:00  =============================================  IN:    IV PiggyBack: 50 ml    Total IN: 50 ml  ---------------------------------------------  OUT:    Voided: 1550 ml    Bulb: 220 ml    Bulb: 25 ml    Total OUT: 1795 ml  ---------------------------------------------  Total NET: -1745 ml    I & Os for current day (as of 07 Mar 2017 06:40)  =============================================  IN:    Osmolite: 120 ml    IV PiggyBack: 50 ml    Total IN: 170 ml  ---------------------------------------------  OUT:    Voided: 725 ml    Bulb: 88 ml    Bulb: 25 ml    Total OUT: 838 ml  ---------------------------------------------  Total NET: -668 ml      ** PHYSICAL EXAM **    -- CONSTITUTIONAL:   -- HEENT: Right face edema improved.  -- FLAP: (+) mucosalization of muscle portion of flap. (+) arterial Cook Doppler signal.   -- NECK: Right side edema improved. No collection appreciated. MARCO A brackish.  -- CARDIOVASCULAR: Regular rate and rhythm. S1, S2.  -- RESPIRATORY: Bilateral breath sounds.   -- ABDOMEN: Soft, nontender, nondistended.  -- EXTREMITIES: Left lower leg incision intact. No collection. MARCO A serosanguinous.  -- VASCULAR: Left foot warm with 2-3 second capillary refill at distal digital pads.   -- NEUROLOGICAL: SILT in left lower extremity. TA/TP, FHL/EHL, FDL/EDL intact in left lower extremity.

## 2017-03-07 NOTE — BRIEF OPERATIVE NOTE - POST-OP DX
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

## 2017-03-07 NOTE — BRIEF OPERATIVE NOTE - PRE-OP DX
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

## 2017-03-07 NOTE — PROGRESS NOTE ADULT - PROBLEM SELECTOR PLAN 1
>>  >> q4H flap checks  >> Continue broad-spectrum antibiotics  >> HOB elevation  >> LLE elevation  >> OOB  >> Peridex TID  >> DVT prophylaxis  >> Continue tube feeds via NGT  >> NPO

## 2017-03-07 NOTE — PROGRESS NOTE ADULT - SUBJECTIVE AND OBJECTIVE BOX
ENT [facility] DAILY PROGRESS NOTE    Interval HPI: 47y Male s/p R segmental mandibulectomy and R neck exploration with fibula free flap reconstruction on 3/1/17.    Overnight: POD 6- CLEMENCIA, pt reports sleeping well in private room but having strange dreams on ativan will d/c and change to benadryl PRN for insomnia. NGT with TF at goal. abx broadened yest per ID recs, tolerated well with no rxn.      Allergies  penicillins (Hives)      MEDICATIONS:  Antiinfectives:   clindamycin IVPB 600milliGRAM(s) IV Intermittent every 8 hours  vancomycin  IVPB 1000milliGRAM(s) IV Intermittent every 12 hours  cefTRIAXone   IVPB  IV Intermittent   cefTRIAXone   IVPB 1Gram(s) IV Intermittent every 24 hours    IV fluids:    Hematologic/Anticoagulation:  enoxaparin Injectable 40milliGRAM(s) SubCutaneous daily    Pain medications/Neuro:  ondansetron Injectable 4milliGRAM(s) IV Push every 6 hours PRN  acetaminophen   Tablet. 650milliGRAM(s) Oral every 6 hours PRN  oxyCODONE  5 mG/acetaminophen 325 mG 1Tablet(s) Oral every 4 hours PRN  oxyCODONE  5 mG/acetaminophen 325 mG 2Tablet(s) Oral every 6 hours PRN  diphenhydrAMINE   Capsule 50milliGRAM(s) Oral at bedtime PRN    Endocrine Medications:     All other standing medications:   losartan 50milliGRAM(s) Oral daily  sodium bicarbonate Mouth Rinse 20milliLiter(s) Swish and Spit every 4 hours  magnesium hydroxide Suspension 30milliLiter(s) Oral daily  senna 2Tablet(s) Oral at bedtime    All other PRN medications:  labetalol Injectable 10milliGRAM(s) IV Push every 6 hours PRN      Vital Signs Last 24 Hrs  T(C): 36.6, Max: 37.2 (03-06 @ 16:50)  T(F): 97.8, Max: 98.9 (03-06 @ 16:50)  HR: 57 (57 - 63)  BP: 127/75 (118/64 - 142/81)  BP(mean): --  RR: 16 (16 - 16)  SpO2: 98% (96% - 99%)    I & Os for 24h ending 03-07 @ 07:00  =============================================  IN:    Osmolite: 960 ml    IV PiggyBack: 50 ml    Total IN: 1010 ml  ---------------------------------------------  OUT:    Voided: 725 ml    Bulb: 138 ml    Bulb: 35 ml    Total OUT: 898 ml  ---------------------------------------------  Total NET: 112 ml    I & Os for current day (as of 03-07 @ 13:13)  =============================================  IN:    Total IN: 0 ml  ---------------------------------------------  OUT:    Voided: 250 ml    Total OUT: 250 ml  ---------------------------------------------  Total NET: -250 ml        PHYSICAL EXAM:    ENT EXAM-   Constitutional: Well-developed, well-nourished.  No hoarseness.     AFVSS, NAD  Arterial and venous doppler signals normal  OC/OP: resection incision line intact  Right neck: flat, incision c/d/i, no fluid collection appreciated, implanted doppler wires secured, MARCO A with brown purulent looking output, significant amount of output despite NPO  Left lower leg in cam boot, MARCO A with serosang output  NGT replaced      LABS:  CBC-                        13.4   14.2  )-----------( 358      ( 07 Mar 2017 11:09 )             38.7     BMP/CMP-  06 Mar 2017 07:14    137    |  100    |  20     ----------------------------<  93     4.4     |  27     |  0.64     Ca    9.0        06 Mar 2017 07:14  Phos  3.6       06 Mar 2017 07:14  Mg     2.4       06 Mar 2017 07:14        Assessment/Plan:  47y Male s/p R segmental mandibulectomy, L fibula MVFF recon, R neck exploration now with concern for fistula. POD 6. Plan for return to OR with Plastics today for washout of R neck given MARCO A drain output.  - cont TF, NPO for OR.  - HOB elevated, no circumferential neck ties or pressure on right neck. Neck should be straight in neutral position or slight flexion  - abx per ID  - Decadron d/kyleigh  - OOB to chair with leg elevated when OOB, full wt bearing with CAM boot  - oral rinses  - bowel regimen  - regional room  - f/u PT    - seen by and d/w attending MD Elan Lawler whom agrees with the above plan      PPX: SCDs, DVT ppx with Lovenox per Dr. Lerman    Dispo planning:   -Home care TBD by how long pt will need to be NPO, will possibly need to send home with NGT/MARCO A drain

## 2017-03-08 RX ORDER — ACETAMINOPHEN 500 MG
1000 TABLET ORAL ONCE
Qty: 0 | Refills: 0 | Status: COMPLETED | OUTPATIENT
Start: 2017-03-08 | End: 2017-03-08

## 2017-03-08 RX ORDER — LOSARTAN POTASSIUM 100 MG/1
50 TABLET, FILM COATED ORAL DAILY
Qty: 0 | Refills: 0 | Status: DISCONTINUED | OUTPATIENT
Start: 2017-03-08 | End: 2017-03-09

## 2017-03-08 RX ORDER — ALPRAZOLAM 0.25 MG
0.25 TABLET ORAL AT BEDTIME
Qty: 0 | Refills: 0 | Status: DISCONTINUED | OUTPATIENT
Start: 2017-03-08 | End: 2017-03-09

## 2017-03-08 RX ORDER — BENZOCAINE AND MENTHOL 5; 1 G/100ML; G/100ML
1 LIQUID ORAL
Qty: 0 | Refills: 0 | Status: DISCONTINUED | OUTPATIENT
Start: 2017-03-08 | End: 2017-03-09

## 2017-03-08 RX ORDER — ACETAMINOPHEN WITH CODEINE 300MG-30MG
15 TABLET ORAL EVERY 4 HOURS
Qty: 0 | Refills: 0 | Status: DISCONTINUED | OUTPATIENT
Start: 2017-03-08 | End: 2017-03-09

## 2017-03-08 RX ORDER — LACTOBACILLUS ACIDOPHILUS 100MM CELL
1 CAPSULE ORAL DAILY
Qty: 0 | Refills: 0 | Status: DISCONTINUED | OUTPATIENT
Start: 2017-03-08 | End: 2017-03-09

## 2017-03-08 RX ORDER — LIDOCAINE 4 G/100G
5 CREAM TOPICAL EVERY 6 HOURS
Qty: 0 | Refills: 0 | Status: DISCONTINUED | OUTPATIENT
Start: 2017-03-08 | End: 2017-03-09

## 2017-03-08 RX ADMIN — LIDOCAINE 5 MILLILITER(S): 4 CREAM TOPICAL at 10:45

## 2017-03-08 RX ADMIN — Medication 20 MILLILITER(S): at 06:18

## 2017-03-08 RX ADMIN — Medication 400 MILLIGRAM(S): at 00:35

## 2017-03-08 RX ADMIN — Medication 1000 MILLIGRAM(S): at 01:00

## 2017-03-08 RX ADMIN — Medication 20 MILLILITER(S): at 10:46

## 2017-03-08 RX ADMIN — Medication 100 MILLIGRAM(S): at 11:48

## 2017-03-08 RX ADMIN — ENOXAPARIN SODIUM 40 MILLIGRAM(S): 100 INJECTION SUBCUTANEOUS at 18:39

## 2017-03-08 RX ADMIN — Medication 20 MILLILITER(S): at 13:40

## 2017-03-08 RX ADMIN — Medication 250 MILLIGRAM(S): at 10:45

## 2017-03-08 RX ADMIN — Medication 100 MILLIGRAM(S): at 01:43

## 2017-03-08 RX ADMIN — Medication 400 MILLIGRAM(S): at 20:02

## 2017-03-08 RX ADMIN — Medication 20 MILLILITER(S): at 18:39

## 2017-03-08 RX ADMIN — Medication 1000 MILLIGRAM(S): at 20:30

## 2017-03-08 RX ADMIN — Medication 400 MILLIGRAM(S): at 07:35

## 2017-03-08 RX ADMIN — Medication 100 MILLIGRAM(S): at 18:39

## 2017-03-08 RX ADMIN — Medication 1000 MILLIGRAM(S): at 08:00

## 2017-03-08 NOTE — PROGRESS NOTE ADULT - PROBLEM SELECTOR PLAN 1
>>   >> Clear liquid diet   >> OOB  >> DVT prophylaxis  >> Continue antibiotics  >> MARCO A care  >> MARCO A teaching  >> HOB elevation  >> Dispo. planning

## 2017-03-08 NOTE — PROGRESS NOTE ADULT - SUBJECTIVE AND OBJECTIVE BOX
SOMMER MORALES   [id number]  47y     T(C): 37, Max: 37.3 (03-08 @ 05:37)  HR: 67 (56 - 75)  BP: 148/84 (122/67 - 148/84)  RR: 15 (15 - 17)  SpO2: 98% (96% - 99%)  Wt(kg): --    PE: AOx3 NAD, neck incision C/DI no signs of any collections, MARCO A drain Serous-Sanguinous. Slight weakness of right lower lip depressors present after initial procedure unchanged consistent with marginal mandibular neuropraxia.    I & Os for current day (as of 03-08 @ 10:45)  =============================================  IN: 1770 ml / OUT: 1835 ml / NET: -65 ml      ondansetron Injectable 4milliGRAM(s) IV Push every 6 hours PRN  labetalol Injectable 10milliGRAM(s) IV Push every 6 hours PRN  enoxaparin Injectable 40milliGRAM(s) SubCutaneous daily  clindamycin IVPB 600milliGRAM(s) IV Intermittent every 8 hours  sodium bicarbonate Mouth Rinse 20milliLiter(s) Swish and Spit every 4 hours  vancomycin  IVPB 1000milliGRAM(s) IV Intermittent every 12 hours  cefTRIAXone   IVPB  IV Intermittent   cefTRIAXone   IVPB 1Gram(s) IV Intermittent every 24 hours  acetaminophen  IVPB. 1000milliGRAM(s) IV Intermittent once  HYDROmorphone  Injectable 1milliGRAM(s) IV Push every 4 hours PRN  metoclopramide Injectable 10milliGRAM(s) IV Push once PRN  lactated ringers. 1000milliLiter(s) IV Continuous <Continuous>  acetaminophen with codeine Elixir 15milliLiter(s) Oral every 4 hours PRN  oxyCODONE  5 mG/acetaminophen 325 mG 1Tablet(s) Oral every 6 hours PRN  lidocaine 2% Viscous 5milliLiter(s) Swish and Spit every 6 hours PRN  benzocaine 15 mG/menthol 3.6 mG Lozenge 1Lozenge Oral every 2 hours PRN                            13.4   14.2  )-----------( 358      ( 07 Mar 2017 11:09 )             38.7

## 2017-03-08 NOTE — PROGRESS NOTE ADULT - SUBJECTIVE AND OBJECTIVE BOX
SUBJECTIVE:  Doing well.   No overnight events.     OBJECTIVE:     ** VITAL SIGNS / I&O's **    T(C): 37.3, Max: 37.3 (03-08 @ 05:37)  T(F): 99.2, Max: 99.2 (03-08 @ 05:37)  HR: 56 (56 - 75)  BP: 128/81 (122/67 - 148/76)  RR: 16 (16 - 17)  SpO2: 98% (96% - 99%)  Wt(kg): --      I & Os for current day (as of 08 Mar 2017 07:04)  =============================================  IN:    Oral Fluid: 720 ml    lactated ringers.: 450 ml    IV PiggyBack: 400 ml    Total IN: 1570 ml  ---------------------------------------------  OUT:    Voided: 1800 ml    Bulb: 35 ml    Total OUT: 1835 ml  ---------------------------------------------  Total NET: -265 ml      ** PHYSICAL EXAM **    -- CONSTITUTIONAL:   -- HEENT: Intraoral incision intact.   -- NECK: Neck soft. No collections. Drain serosanguinous.  -- CARDIOVASCULAR: Regular rate and rhythm. S1, S2.  -- RESPIRATORY: Bilateral breath sounds.   -- ABDOMEN: Soft, nontender, nondistended.  -- EXTREMITIES: Left lower leg incision intact. Soft. No collections.   -- VASCULAR: Left foot warm.   -- NEUROLOGICAL: SILT in left foot. TA/TP, FHL/EHL, EDL/FDL intact in left lower extremity.    ** LABS **                         13.4   14.2  )-----------( 358      ( 07 Mar 2017 11:09 )             38.7             CAPILLARY BLOOD GLUCOSE

## 2017-03-08 NOTE — PROGRESS NOTE ADULT - SUBJECTIVE AND OBJECTIVE BOX
ENT [facility] DAILY PROGRESS NOTE    Overnight events/Interval HPI: Taken to OR yesterday for neck washout and removal of flap. Pt progressing well post-op. Tolerating CLD. Pain controlled. Denies N/V. Switched to Clinda IV. Will go home on Clinda PO.            Allergies    penicillins (Hives)    Intolerances        MEDICATIONS:  Antiinfectives:   clindamycin IVPB 600milliGRAM(s) IV Intermittent every 8 hours    IV fluids:    Hematologic/Anticoagulation:  enoxaparin Injectable 40milliGRAM(s) SubCutaneous daily    Pain medications/Neuro:  ondansetron Injectable 4milliGRAM(s) IV Push every 6 hours PRN  acetaminophen  IVPB. 1000milliGRAM(s) IV Intermittent once  HYDROmorphone  Injectable 1milliGRAM(s) IV Push every 4 hours PRN  metoclopramide Injectable 10milliGRAM(s) IV Push once PRN  acetaminophen with codeine Elixir 15milliLiter(s) Oral every 4 hours PRN  oxyCODONE  5 mG/acetaminophen 325 mG 1Tablet(s) Oral every 6 hours PRN    Endocrine Medications:     All other standing medications:   sodium bicarbonate Mouth Rinse 20milliLiter(s) Swish and Spit every 4 hours  lactobacillus acidophilus 1Tablet(s) Oral daily    All other PRN medications:  labetalol Injectable 10milliGRAM(s) IV Push every 6 hours PRN  lidocaine 2% Viscous 5milliLiter(s) Swish and Spit every 6 hours PRN  benzocaine 15 mG/menthol 3.6 mG Lozenge 1Lozenge Oral every 2 hours PRN      Vital Signs Last 24 Hrs  T(C): 37, Max: 37.3 (03-08 @ 05:37)  T(F): 98.6, Max: 99.2 (03-08 @ 05:37)  HR: 67 (56 - 75)  BP: 148/84 (122/67 - 148/84)  BP(mean): --  RR: 15 (15 - 17)  SpO2: 98% (96% - 99%)    I & Os for 24h ending 03-08 @ 07:00  =============================================  IN:    Oral Fluid: 720 ml    lactated ringers.: 650 ml    IV PiggyBack: 400 ml    Total IN: 1770 ml  ---------------------------------------------  OUT:    Voided: 1800 ml    Bulb: 35 ml    Total OUT: 1835 ml  ---------------------------------------------  Total NET: -65 ml    I & Os for current day (as of 03-08 @ 11:32)  =============================================  IN:    Oral Fluid: 360 ml    Total IN: 360 ml  ---------------------------------------------  OUT:    Voided: 400 ml    Total OUT: 400 ml  ---------------------------------------------  Total NET: -40 ml        PHYSICAL EXAM:    ENT EXAM-   Constitutional: Well-developed, well-nourished.  No hoarseness.     AFVSS, NAD  OC/OP: incision line intact  Right neck: flat, incision c/d/i, no fluid collection appreciated, MARCO A with minimal serosanguinous output  Left lower leg in cam boot    LABS:  CBC-                        13.4   14.2  )-----------( 358      ( 07 Mar 2017 11:09 )             38.7     BMP/CMP-        Coagulation Studies-    Endocrine Panel-              RADIOLOGY & ADDITIONAL STUDIES:      Assessment/Plan:  47y Male s/p R segmental mandibulectomy, L fibula MVFF recon, R neck exploration now with concern for fistula. POD 6. and POD1 for neck washout and flap removal  - CLD  - HOB elevated  - Clinda   - OOB to chair with leg elevated when OOB, full wt bearing with CAM boot  - oral rinses  - bowel regimen  - regional room  - f/u PT    - seen by and d/w attending MD Elan Lawler whom agrees with the above plan      PPX: SCDs, DVT ppx with Lovenox per Dr. Lerman    Dispo planning:   -Will go home with MARCO A drain in neck

## 2017-03-09 VITALS
RESPIRATION RATE: 14 BRPM | TEMPERATURE: 99 F | HEART RATE: 61 BPM | OXYGEN SATURATION: 100 % | DIASTOLIC BLOOD PRESSURE: 63 MMHG | SYSTOLIC BLOOD PRESSURE: 117 MMHG

## 2017-03-09 LAB
ANION GAP SERPL CALC-SCNC: 4 MMOL/L — LOW (ref 9–16)
BUN SERPL-MCNC: 14 MG/DL — SIGNIFICANT CHANGE UP (ref 7–23)
CALCIUM SERPL-MCNC: 8.8 MG/DL — SIGNIFICANT CHANGE UP (ref 8.5–10.5)
CHLORIDE SERPL-SCNC: 102 MMOL/L — SIGNIFICANT CHANGE UP (ref 96–108)
CO2 SERPL-SCNC: 32 MMOL/L — HIGH (ref 22–31)
CREAT SERPL-MCNC: 0.71 MG/DL — SIGNIFICANT CHANGE UP (ref 0.5–1.3)
GLUCOSE SERPL-MCNC: 108 MG/DL — HIGH (ref 70–99)
HCT VFR BLD CALC: 34.4 % — LOW (ref 39–50)
HGB BLD-MCNC: 11.7 G/DL — LOW (ref 13–17)
MAGNESIUM SERPL-MCNC: 2.5 MG/DL — HIGH (ref 1.6–2.4)
MCHC RBC-ENTMCNC: 30.1 PG — SIGNIFICANT CHANGE UP (ref 27–34)
MCHC RBC-ENTMCNC: 34 G/DL — SIGNIFICANT CHANGE UP (ref 32–36)
MCV RBC AUTO: 88.4 FL — SIGNIFICANT CHANGE UP (ref 80–100)
PHOSPHATE SERPL-MCNC: 2.1 MG/DL — LOW (ref 2.5–4.5)
PLATELET # BLD AUTO: 331 K/UL — SIGNIFICANT CHANGE UP (ref 150–400)
POTASSIUM SERPL-MCNC: 4.1 MMOL/L — SIGNIFICANT CHANGE UP (ref 3.5–5.3)
POTASSIUM SERPL-SCNC: 4.1 MMOL/L — SIGNIFICANT CHANGE UP (ref 3.5–5.3)
RBC # BLD: 3.89 M/UL — LOW (ref 4.2–5.8)
RBC # FLD: 12.9 % — SIGNIFICANT CHANGE UP (ref 10.3–16.9)
SODIUM SERPL-SCNC: 138 MMOL/L — SIGNIFICANT CHANGE UP (ref 135–145)
WBC # BLD: 9 K/UL — SIGNIFICANT CHANGE UP (ref 3.8–10.5)
WBC # FLD AUTO: 9 K/UL — SIGNIFICANT CHANGE UP (ref 3.8–10.5)

## 2017-03-09 PROCEDURE — 88300 SURGICAL PATH GROSS: CPT

## 2017-03-09 PROCEDURE — C9352: CPT

## 2017-03-09 PROCEDURE — 97161 PT EVAL LOW COMPLEX 20 MIN: CPT

## 2017-03-09 PROCEDURE — 71045 X-RAY EXAM CHEST 1 VIEW: CPT

## 2017-03-09 PROCEDURE — C1713: CPT

## 2017-03-09 PROCEDURE — 85610 PROTHROMBIN TIME: CPT

## 2017-03-09 PROCEDURE — 85730 THROMBOPLASTIN TIME PARTIAL: CPT

## 2017-03-09 PROCEDURE — 83735 ASSAY OF MAGNESIUM: CPT

## 2017-03-09 PROCEDURE — C1757: CPT

## 2017-03-09 PROCEDURE — 80048 BASIC METABOLIC PNL TOTAL CA: CPT

## 2017-03-09 PROCEDURE — 97530 THERAPEUTIC ACTIVITIES: CPT

## 2017-03-09 PROCEDURE — 86850 RBC ANTIBODY SCREEN: CPT

## 2017-03-09 PROCEDURE — C1889: CPT

## 2017-03-09 PROCEDURE — 88304 TISSUE EXAM BY PATHOLOGIST: CPT

## 2017-03-09 PROCEDURE — 84100 ASSAY OF PHOSPHORUS: CPT

## 2017-03-09 PROCEDURE — 86900 BLOOD TYPING SEROLOGIC ABO: CPT

## 2017-03-09 PROCEDURE — 88311 DECALCIFY TISSUE: CPT

## 2017-03-09 PROCEDURE — 36415 COLL VENOUS BLD VENIPUNCTURE: CPT

## 2017-03-09 PROCEDURE — 86901 BLOOD TYPING SEROLOGIC RH(D): CPT

## 2017-03-09 PROCEDURE — 88309 TISSUE EXAM BY PATHOLOGIST: CPT

## 2017-03-09 PROCEDURE — 85027 COMPLETE CBC AUTOMATED: CPT

## 2017-03-09 RX ORDER — ACETAMINOPHEN WITH CODEINE 300MG-30MG
1 TABLET ORAL
Qty: 20 | Refills: 0 | OUTPATIENT
Start: 2017-03-09

## 2017-03-09 RX ORDER — BENZOCAINE AND MENTHOL 5; 1 G/100ML; G/100ML
1 LIQUID ORAL
Qty: 1 | Refills: 0 | OUTPATIENT
Start: 2017-03-09

## 2017-03-09 RX ORDER — BENZOCAINE 10 %
2 GEL (GRAM) MUCOUS MEMBRANE
Qty: 1 | Refills: 0 | OUTPATIENT
Start: 2017-03-09

## 2017-03-09 RX ORDER — LACTOBACILLUS ACIDOPHILUS 100MM CELL
1 CAPSULE ORAL
Qty: 10 | Refills: 0 | OUTPATIENT
Start: 2017-03-09 | End: 2017-03-19

## 2017-03-09 RX ADMIN — Medication 100 MILLIGRAM(S): at 03:10

## 2017-03-09 RX ADMIN — LOSARTAN POTASSIUM 50 MILLIGRAM(S): 100 TABLET, FILM COATED ORAL at 07:46

## 2017-03-09 NOTE — DISCHARGE NOTE ADULT - INSTRUCTIONS
1. Report any fever, chills, difficulty breathing, difficulty swallowing, and change in your voice, bleeding or purulent drainage from your incision sites, or any significant swelling to your neck/leg to the doctor immediately.  2. Diet: Full liquid diet- this consists of foods such as milk, pudding, thick liquids such as strained soups, or ice cream. Nothing that requires chewing or has any chunks/pieces of food. You can supplement your diet with protein shakes such as ensure or other such products to keep up your daily caloric/protein intake.  4. Activity: no heavy lifting, do not lift anything heavier than a gallon of milk until after you follow up appointment with your doctor, no strenuous activity such as running or biking. You no are no longer required to wear CAM boot for walking but you can continue to wear it if it is more comfortable for you.   5. Shower/Bathing: you can shower normally, just do not scrub at any incisions, keep neck MARCO A drain site clean and dry. After shower re-wrap the leg with Kerlix wrap.  6. Wound care: keep your incision site clean and dry, see MARCO A drain care below.   7. Take all medications as prescribed.   8. Continue all of your normal home medications unless told otherwise.  9. You are being sent home with a MARCO A drain see care for your drain below.  10. Oral care: mouth rinses are to be done after each meal and before bed. Use the following mouth rinse after eating/drinking anything to keep incisions lines clean. 1 cup warm water, ? 1/4 teaspoon baking soda, and ? 1/8 teaspoon salt. Take small sips and swish them around in your mouth. Then rinse with plain water.

## 2017-03-09 NOTE — DISCHARGE NOTE ADULT - MEDICATION SUMMARY - MEDICATIONS TO TAKE
I will START or STAY ON the medications listed below when I get home from the hospital:    Tylenol with Codeine #3 oral tablet  -- 1 tab(s) by mouth 4 times a day MDD:6 tabs  -- Caution federal law prohibits the transfer of this drug to any person other  than the person for whom it was prescribed.  May cause drowsiness.  Alcohol may intensify this effect.  Use care when operating dangerous machinery.  This product contains acetaminophen.  Do not use  with any other product containing acetaminophen to prevent possible liver damage.  Using more of this medication than prescribed may cause serious breathing problems.    -- Indication: For As needed for moderate to severe pain    Lipitor 10 mg oral tablet  -- 1 tab(s) by mouth once a day  -- Indication: For Home medication    losartan-hydroCHLOROthiazide 50mg-12.5mg oral tablet  -- 1 tab(s) by mouth once a day  -- Indication: For Home medication    Cepacol Dual Relief Sore Throat Mint 5% mucous membrane spray  -- 2 spray(s) mucous membrane 4 times a day, As Needed for sore throat  -- For external use only.    -- Indication: For As needed for sore throat    clindamycin 300 mg oral capsule  -- 1 cap(s) by mouth every 6 hours x 7 days  -- Finish all this medication unless otherwise directed by prescriber.  Medication should be taken with plenty of water.    -- Indication: For To prevent post op infection take for full course    benzocaine-menthol 15 mg-3.6 mg mucous membrane lozenge  -- 1 lozenge mucous membrane 4 times a day, As Needed sore throat  -- Indication: For As needed for sore throat    Acidophilus Probiotic Blend oral capsule  -- 1 cap(s) by mouth once a day while taking antibiotics   -- Indication: For Take while on antibotics and for a few days afterward.

## 2017-03-09 NOTE — DISCHARGE NOTE ADULT - CARE PROVIDERS DIRECT ADDRESSES
,DirectAddress_Unknown,[email or web address],DirectAddress_Unknown

## 2017-03-09 NOTE — DISCHARGE NOTE ADULT - PATIENT PORTAL LINK FT
“You can access the Follow[employer] Patient Portal, offered by [facility], by registering with the following website: [email or web address]”

## 2017-03-09 NOTE — DISCHARGE NOTE ADULT - PLAN OF CARE
s/p resection Follow up with Dr. Lerman on Monday. Call his office to set up your appointment time once you leave the hospital.     Follow up with Dr. Lawler next week. Call his office to set up your appointment time once you leave the hospital.     You are being sent home with a MARCO A drain in place.     MARCO A drain care:   Empty the bulb when it is half full or every 12 hrs.  To empty drain:    1. Wash your hands with soap and water.   2. Remove the plug from the bulb.   3. Pour the fluid into a measuring cup.   4. Clean the plug with an alcohol swab or a cotton ball dipped in rubbing alcohol.   5. Squeeze the bulb flat and put the plug back in. The bulb should stay flat until it starts to fill with fluid again.  6. Measure the amount of fluid you pour out. Write down how much fluid you empty from the MARCO A drain and the date and time you collected it.  7. Flush the fluid down the toilet. Wash your hands.    Monitor drain site where it comes out of the skin for any evidence of redness or purulent drainage. Call your doctor immediately if the drain comes out for any reason.

## 2017-03-09 NOTE — DISCHARGE NOTE ADULT - CARE PROVIDER_API CALL
Elan Lawler; DDS), Dental Medicine; OralMaxillofacial Surgery  [address] [place] [address]  Phone: [phone]  Fax: [phone]    Lerman, Oren Z (MD), Plastic Surgery  [address] [place] [address]  Phone: [phone]  Fax: [phone]

## 2017-03-09 NOTE — PROGRESS NOTE ADULT - SUBJECTIVE AND OBJECTIVE BOX
INTERVAL HPI/OVERNIGHT EVENTS:  He is s/p exploration of right neck and flap washout, debridement and explantation of free flap on 3/7.  Flap thrombosis and necrosis were found.    Per pt:  CONSTITUTIONAL:  No fever, chills, night sweats  EYES:  No photophobia or visual changes  CARDIOVASCULAR:  No chest pain  RESPIRATORY:  No cough, wheezing, or SOB   GASTROINTESTINAL:  No nausea, vomiting, diarrhea, constipation, or abdominal pain  GENITOURINARY:  No frequency, urgency, dysuria or hematuria  NEUROLOGIC:  No headache, lightheadedness      ANTIBIOTICS/RELEVANT:    Vancomycin 3/6  CAX 3/6  Clindamycin 3/2      Vital Signs Last 24 Hrs  T(C): 37.4, Max: 37.4 (03-08 @ 16:21)  T(F): 99.3, Max: 99.3 (03-08 @ 16:21)  HR: 73 (56 - 74)  BP: 125/75 (125/75 - 148/84)  BP(mean): --  RR: 16 (15 - 16)  SpO2: 95% (95% - 100%)    PHYSICAL EXAM:  Constitutional:  Well-developed, well nourished  Eyes:  Sclerae anicterica, conjunctivae clear  Ear/Nose/Throat:  No nasal exudate or sinus tenderness;    Neck: right MARCO A drain in incision draining small amount of serosanguinous material.  Incision without erythema or exudate  Respiratory:  Clear bilaterally  Cardiovascular:  RRR, S1S2, no murmur appreciated  Gastrointestinal:  Symmetric, normoactive BS, soft, NT, no masses, guarding or rebound.  No HSM  Extremities:  No edema      LABS:                        13.4   14.2  )-----------( 358      ( 07 Mar 2017 11:09 )             38.7         MICROBIOLOGY:  No culture data

## 2017-03-09 NOTE — DISCHARGE NOTE ADULT - CARE PLAN
Principal Discharge DX:	Ameloblastoma  Goal:	s/p resection  Instructions for follow-up, activity and diet:	Follow up with Dr. Lerman on Monday. Call his office to set up your appointment time once you leave the hospital.     Follow up with Dr. Lawler next week. Call his office to set up your appointment time once you leave the hospital.     You are being sent home with a MARCO A drain in place.     MARCO A drain care:   Empty the bulb when it is half full or every 12 hrs.  To empty drain:    1. Wash your hands with soap and water.   2. Remove the plug from the bulb.   3. Pour the fluid into a measuring cup.   4. Clean the plug with an alcohol swab or a cotton ball dipped in rubbing alcohol.   5. Squeeze the bulb flat and put the plug back in. The bulb should stay flat until it starts to fill with fluid again.  6. Measure the amount of fluid you pour out. Write down how much fluid you empty from the MARCO A drain and the date and time you collected it.  7. Flush the fluid down the toilet. Wash your hands.    Monitor drain site where it comes out of the skin for any evidence of redness or purulent drainage. Call your doctor immediately if the drain comes out for any reason.

## 2017-03-09 NOTE — DISCHARGE NOTE ADULT - HOSPITAL COURSE
47y Male s/p R segmental mandibulectomy and R neck exploration with fibula free flap reconstruction on 3/1/17.  POD 0: H/H stable. Flap well perfused.   POD 1- no acute events overnight, Pain controlled. Breathing comfortably. No N/V. Flap well perfused, NGT advanced.   POD2: Flap well perfused. tolerating TF. tx to SDU  POD 3: CLEMENCIA overnight. Pain controlled. denies N/V. progressing well.  POD 4: Started on diet this AM without issue. NGT removed. Preparing for discharge  POD 5: Started on diet however, noted significant increase in drain output, drain with brown muddy drainage. NGT replaced.  broaden abx, ID consulted  POD 6: NGT with TF at goal. abx broadened yest per ID recs, tolerated well with no rxn. In regional room still on q 4 hr flap checks. Went to OR with plastics team for neck washout/drainage, found to have flap thrombosis/necrosis, flap removed.  POD 7: Taken to OR yesterday for neck washout and removal of flap. Pt progressing well post-op. Having alot of pain post op, tolerating minimal/mod PO intake. Denies N/V. Switched to Clinda IV.  POD 8: Advanced to FLD, pain controlled, stable for d/c home with close outpt follow up, will d/c home with MARCO A drain in R neck.       Discharge exam:   ENT EXAM-   Constitutional: Well-developed, well-nourished.  No hoarseness.     AFVSS, NAD  OC/OP: incision line intact  Right neck: flat, incision c/d/i, no fluid collection appreciated, MARCO A with minimal serosanguinous output  Left lower leg in ace wrap, MARCO A drain removed, + cap refill, good movt, good sensation  non labored breathing     Vital Signs Last 24 Hrs  T(C): 36, Max: 37.4 (03-08 @ 16:21)  T(F): 96.8, Max: 99.3 (03-08 @ 16:21)  HR: 58 (58 - 74)  BP: 122/73 (122/73 - 148/84)  BP(mean): --  RR: 16 (15 - 18)  SpO2: 95% (95% - 100%)

## 2017-03-09 NOTE — PROGRESS NOTE ADULT - PROVIDER SPECIALTY LIST ADULT
ENT
Infectious Disease
Plastic Surgery
ENT
Plastic Surgery
Plastic Surgery

## 2017-03-13 ENCOUNTER — APPOINTMENT (OUTPATIENT)
Dept: PLASTIC SURGERY | Facility: CLINIC | Age: 47
End: 2017-03-13

## 2017-03-13 DIAGNOSIS — M27.2 INFLAMMATORY CONDITIONS OF JAWS: ICD-10-CM

## 2017-03-13 DIAGNOSIS — M19.90 UNSPECIFIED OSTEOARTHRITIS, UNSPECIFIED SITE: ICD-10-CM

## 2017-03-13 DIAGNOSIS — I96 GANGRENE, NOT ELSEWHERE CLASSIFIED: ICD-10-CM

## 2017-03-13 DIAGNOSIS — I10 ESSENTIAL (PRIMARY) HYPERTENSION: ICD-10-CM

## 2017-03-13 DIAGNOSIS — D16.5 BENIGN NEOPLASM OF LOWER JAW BONE: ICD-10-CM

## 2017-03-13 DIAGNOSIS — E78.5 HYPERLIPIDEMIA, UNSPECIFIED: ICD-10-CM

## 2017-03-13 DIAGNOSIS — G47.33 OBSTRUCTIVE SLEEP APNEA (ADULT) (PEDIATRIC): ICD-10-CM

## 2017-03-13 DIAGNOSIS — G47.00 INSOMNIA, UNSPECIFIED: ICD-10-CM

## 2017-03-13 DIAGNOSIS — T81.718A COMPLICATION OF OTHER ARTERY FOLLOWING A PROCEDURE, NOT ELSEWHERE CLASSIFIED, INITIAL ENCOUNTER: ICD-10-CM

## 2017-03-13 DIAGNOSIS — T81.72XA COMPLICATION OF VEIN FOLLOWING A PROCEDURE, NOT ELSEWHERE CLASSIFIED, INITIAL ENCOUNTER: ICD-10-CM

## 2017-03-13 DIAGNOSIS — K50.90 CROHN'S DISEASE, UNSPECIFIED, WITHOUT COMPLICATIONS: ICD-10-CM

## 2017-03-13 DIAGNOSIS — Z87.442 PERSONAL HISTORY OF URINARY CALCULI: ICD-10-CM

## 2017-03-13 DIAGNOSIS — I74.8 EMBOLISM AND THROMBOSIS OF OTHER ARTERIES: ICD-10-CM

## 2017-03-13 DIAGNOSIS — Z88.0 ALLERGY STATUS TO PENICILLIN: ICD-10-CM

## 2017-03-13 DIAGNOSIS — I82.890 ACUTE EMBOLISM AND THROMBOSIS OF OTHER SPECIFIED VEINS: ICD-10-CM
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
## 2017-03-15 ENCOUNTER — OTHER (OUTPATIENT)
Age: 47
End: 2017-03-15

## 2017-03-15 LAB — SURGICAL PATHOLOGY STUDY: SIGNIFICANT CHANGE UP

## 2017-03-16 ENCOUNTER — APPOINTMENT (OUTPATIENT)
Dept: PLASTIC SURGERY | Facility: CLINIC | Age: 47
End: 2017-03-16

## 2017-03-22 RX ORDER — OMEGA-3 ACID ETHYL ESTERS 1 G
1 CAPSULE ORAL
Qty: 0 | Refills: 0 | COMMUNITY

## 2017-04-04 ENCOUNTER — APPOINTMENT (OUTPATIENT)
Dept: PLASTIC SURGERY | Facility: CLINIC | Age: 47
End: 2017-04-04

## 2017-04-04 DIAGNOSIS — D16.5 BENIGN NEOPLASM OF LOWER JAW BONE: ICD-10-CM

## 2017-04-06 PROBLEM — D16.5 AMELOBLASTOMA OF JAW: Status: ACTIVE | Noted: 2017-02-16

## 2017-08-18 PROBLEM — I10 ESSENTIAL (PRIMARY) HYPERTENSION: Chronic | Status: ACTIVE | Noted: 2017-02-10

## 2017-08-18 PROBLEM — K50.90 CROHN'S DISEASE, UNSPECIFIED, WITHOUT COMPLICATIONS: Chronic | Status: ACTIVE | Noted: 2017-02-10

## 2017-08-18 PROBLEM — M19.90 UNSPECIFIED OSTEOARTHRITIS, UNSPECIFIED SITE: Chronic | Status: ACTIVE | Noted: 2017-02-10

## 2017-08-18 PROBLEM — G47.33 OBSTRUCTIVE SLEEP APNEA (ADULT) (PEDIATRIC): Chronic | Status: ACTIVE | Noted: 2017-02-10

## 2017-08-18 PROBLEM — N20.0 CALCULUS OF KIDNEY: Chronic | Status: ACTIVE | Noted: 2017-02-10

## 2017-09-12 RX ORDER — ATORVASTATIN CALCIUM 80 MG/1
1 TABLET, FILM COATED ORAL
Qty: 0 | Refills: 0 | COMMUNITY

## 2017-09-12 NOTE — ASU PATIENT PROFILE, ADULT - PMH
Arthritis    Crohns disease  mild  Hypertension    Kidney stones    JORGITO on CPAP Ameloblastoma of jaw  h/o  Arthritis    Crohns disease  mild  Hypertension    Kidney stones    JORGITO on CPAP

## 2017-09-12 NOTE — ASU PATIENT PROFILE, ADULT - PSH
Elective surgery for purposes other than treating health conditions  mandible x3  H/O arthroscopy of right knee  x2  H/O breast surgery  left  H/O lithotripsy    History of appendectomy    Surgery, elective  Mole removed from back 2/2017

## 2017-09-13 ENCOUNTER — INPATIENT (INPATIENT)
Facility: HOSPITAL | Age: 47
LOS: 1 days | Discharge: HOME | DRG: 517 | End: 2017-09-15
Attending: DENTIST | Admitting: DENTIST
Payer: COMMERCIAL

## 2017-09-13 VITALS
RESPIRATION RATE: 16 BRPM | HEIGHT: 70 IN | WEIGHT: 169.54 LBS | DIASTOLIC BLOOD PRESSURE: 80 MMHG | TEMPERATURE: 97 F | HEART RATE: 52 BPM | SYSTOLIC BLOOD PRESSURE: 151 MMHG | OXYGEN SATURATION: 100 %

## 2017-09-13 DIAGNOSIS — Z41.9 ENCOUNTER FOR PROCEDURE FOR PURPOSES OTHER THAN REMEDYING HEALTH STATE, UNSPECIFIED: Chronic | ICD-10-CM

## 2017-09-13 DIAGNOSIS — Z98.890 OTHER SPECIFIED POSTPROCEDURAL STATES: Chronic | ICD-10-CM

## 2017-09-13 DIAGNOSIS — Z90.49 ACQUIRED ABSENCE OF OTHER SPECIFIED PARTS OF DIGESTIVE TRACT: Chronic | ICD-10-CM

## 2017-09-13 RX ORDER — DEXTROSE 50 % IN WATER 50 %
25 SYRINGE (ML) INTRAVENOUS ONCE
Qty: 0 | Refills: 0 | Status: DISCONTINUED | OUTPATIENT
Start: 2017-09-13 | End: 2017-09-15

## 2017-09-13 RX ORDER — ACETAMINOPHEN 500 MG
1000 TABLET ORAL ONCE
Qty: 0 | Refills: 0 | Status: COMPLETED | OUTPATIENT
Start: 2017-09-13 | End: 2017-09-13

## 2017-09-13 RX ORDER — BUPIVACAINE 13.3 MG/ML
20 INJECTION, SUSPENSION, LIPOSOMAL INFILTRATION ONCE
Qty: 0 | Refills: 0 | Status: DISCONTINUED | OUTPATIENT
Start: 2017-09-13 | End: 2017-09-15

## 2017-09-13 RX ORDER — ONDANSETRON 8 MG/1
4 TABLET, FILM COATED ORAL EVERY 6 HOURS
Qty: 0 | Refills: 0 | Status: DISCONTINUED | OUTPATIENT
Start: 2017-09-13 | End: 2017-09-15

## 2017-09-13 RX ORDER — DEXAMETHASONE 0.5 MG/5ML
8 ELIXIR ORAL EVERY 8 HOURS
Qty: 0 | Refills: 0 | Status: DISCONTINUED | OUTPATIENT
Start: 2017-09-13 | End: 2017-09-13

## 2017-09-13 RX ORDER — DEXTROSE 50 % IN WATER 50 %
1 SYRINGE (ML) INTRAVENOUS ONCE
Qty: 0 | Refills: 0 | Status: DISCONTINUED | OUTPATIENT
Start: 2017-09-13 | End: 2017-09-15

## 2017-09-13 RX ORDER — OXYCODONE AND ACETAMINOPHEN 5; 325 MG/1; MG/1
1 TABLET ORAL EVERY 4 HOURS
Qty: 0 | Refills: 0 | Status: DISCONTINUED | OUTPATIENT
Start: 2017-09-13 | End: 2017-09-15

## 2017-09-13 RX ORDER — ENOXAPARIN SODIUM 100 MG/ML
40 INJECTION SUBCUTANEOUS ONCE
Qty: 0 | Refills: 0 | Status: COMPLETED | OUTPATIENT
Start: 2017-09-13 | End: 2017-09-13

## 2017-09-13 RX ORDER — ENOXAPARIN SODIUM 100 MG/ML
40 INJECTION SUBCUTANEOUS DAILY
Qty: 0 | Refills: 0 | Status: DISCONTINUED | OUTPATIENT
Start: 2017-09-14 | End: 2017-09-15

## 2017-09-13 RX ORDER — SODIUM CHLORIDE 9 MG/ML
1000 INJECTION, SOLUTION INTRAVENOUS
Qty: 0 | Refills: 0 | Status: DISCONTINUED | OUTPATIENT
Start: 2017-09-13 | End: 2017-09-14

## 2017-09-13 RX ORDER — LOSARTAN POTASSIUM 100 MG/1
50 TABLET, FILM COATED ORAL ONCE
Qty: 0 | Refills: 0 | Status: COMPLETED | OUTPATIENT
Start: 2017-09-13 | End: 2017-09-14

## 2017-09-13 RX ORDER — MORPHINE SULFATE 50 MG/1
4 CAPSULE, EXTENDED RELEASE ORAL
Qty: 0 | Refills: 0 | Status: DISCONTINUED | OUTPATIENT
Start: 2017-09-13 | End: 2017-09-15

## 2017-09-13 RX ORDER — DEXAMETHASONE 0.5 MG/5ML
8 ELIXIR ORAL EVERY 8 HOURS
Qty: 0 | Refills: 0 | Status: DISCONTINUED | OUTPATIENT
Start: 2017-09-13 | End: 2017-09-14

## 2017-09-13 RX ORDER — SODIUM CHLORIDE 9 MG/ML
1000 INJECTION, SOLUTION INTRAVENOUS
Qty: 0 | Refills: 0 | Status: DISCONTINUED | OUTPATIENT
Start: 2017-09-13 | End: 2017-09-15

## 2017-09-13 RX ORDER — INSULIN LISPRO 100/ML
VIAL (ML) SUBCUTANEOUS ONCE
Qty: 0 | Refills: 0 | Status: COMPLETED | OUTPATIENT
Start: 2017-09-13 | End: 2017-09-13

## 2017-09-13 RX ORDER — ATORVASTATIN CALCIUM 80 MG/1
10 TABLET, FILM COATED ORAL ONCE
Qty: 0 | Refills: 0 | Status: COMPLETED | OUTPATIENT
Start: 2017-09-13 | End: 2017-09-13

## 2017-09-13 RX ORDER — GLUCAGON INJECTION, SOLUTION 0.5 MG/.1ML
1 INJECTION, SOLUTION SUBCUTANEOUS ONCE
Qty: 0 | Refills: 0 | Status: DISCONTINUED | OUTPATIENT
Start: 2017-09-13 | End: 2017-09-15

## 2017-09-13 RX ORDER — ACETAMINOPHEN 500 MG
650 TABLET ORAL EVERY 6 HOURS
Qty: 0 | Refills: 0 | Status: DISCONTINUED | OUTPATIENT
Start: 2017-09-13 | End: 2017-09-15

## 2017-09-13 RX ORDER — DEXTROSE 50 % IN WATER 50 %
12.5 SYRINGE (ML) INTRAVENOUS ONCE
Qty: 0 | Refills: 0 | Status: DISCONTINUED | OUTPATIENT
Start: 2017-09-13 | End: 2017-09-15

## 2017-09-13 RX ORDER — LABETALOL HCL 100 MG
10 TABLET ORAL EVERY 4 HOURS
Qty: 0 | Refills: 0 | Status: DISCONTINUED | OUTPATIENT
Start: 2017-09-13 | End: 2017-09-15

## 2017-09-13 RX ORDER — HYDROCHLOROTHIAZIDE 25 MG
12.5 TABLET ORAL DAILY
Qty: 0 | Refills: 0 | Status: DISCONTINUED | OUTPATIENT
Start: 2017-09-13 | End: 2017-09-15

## 2017-09-13 RX ADMIN — SODIUM CHLORIDE 75 MILLILITER(S): 9 INJECTION, SOLUTION INTRAVENOUS at 18:05

## 2017-09-13 RX ADMIN — Medication: at 16:10

## 2017-09-13 RX ADMIN — Medication 1000 MILLIGRAM(S): at 22:25

## 2017-09-13 RX ADMIN — Medication 101.6 MILLIGRAM(S): at 22:20

## 2017-09-13 RX ADMIN — Medication 400 MILLIGRAM(S): at 21:55

## 2017-09-13 RX ADMIN — Medication 100 MILLIGRAM(S): at 21:17

## 2017-09-13 RX ADMIN — ENOXAPARIN SODIUM 40 MILLIGRAM(S): 100 INJECTION SUBCUTANEOUS at 07:40

## 2017-09-13 NOTE — H&P ADULT - PMH
Ameloblastoma of jaw  h/o  Arthritis    Crohns disease  mild  Hypertension    Kidney stones    JORGITO on CPAP

## 2017-09-13 NOTE — BRIEF OPERATIVE NOTE - OPERATION/FINDINGS
Mandibular defect reconstructed using left iliac crest bone graft. See dictated operative report for full details.

## 2017-09-13 NOTE — H&P ADULT - HISTORY OF PRESENT ILLNESS
ENT H&P/POSTOP CHECK NOTE    HPI: 48yo Male hx ameloblastoma of R mandible s/p segmental mandibulectomy and fibular MVFF 3/1/17 c/b fibular flap failure with subsequent removal of flap 3/7/17. Patient now s/p left iliac crest bone graft for reconstruction of mandibular defect on 9/13.    Interval:     Allergies  penicillins (Hives)    PAST MEDICAL HISTORY:  Ameloblastoma of jaw  Kidney stones  JORGITO on CPAP  Crohns disease: mild  Arthritis  Hypertension  prothrombin mutation    PE:      A/P:  48yo Male hx ameloblastoma of R mandible s/p segmental mandibulectomy and fibular MVFF 3/1/17 c/b fibular flap failure with subsequent removal of flap 3/7/17. Patient now s/p left iliac crest bone graft for reconstruction of mandibular defect on 9/13.    -prn pain control  -prn nausea  -prn HTN  -Clinda IV for abx ppx  -dex 8q8  -AISS/FS  -restart home anti-HTN, lipitor and IBD meds  -up ad annie  -Holly   -FLD as tolerated  -HOB@30  -IS  -DVT ppx: SCDs, lovenox in AM    Dispo: SDU for airway monitoring    Seen with chief resident and d/w attending ENT H&P/POSTOP CHECK NOTE    HPI: 46yo Male hx ameloblastoma of R mandible s/p segmental mandibulectomy and fibular MVFF 3/1/17 c/b fibular flap failure with subsequent removal of flap 3/7/17. Patient now s/p left iliac crest bone graft for reconstruction of mandibular defect on 9/13.    Interval: Extubated in OR, transferred to PACu in stable condition. Seen and examined at bedside. pain well controlled, no nauseated, no PO as yet.     Allergies  penicillins (Hives)    PAST MEDICAL HISTORY:  Ameloblastoma of jaw  Kidney stones  JORGITO on CPAP  Crohns disease: mild  Arthritis  Hypertension  prothrombin mutation    Vital Signs Last 24 Hrs  T(C): 36.9 (13 Sep 2017 13:45), Max: 36.9 (13 Sep 2017 13:45)  T(F): 98.4 (13 Sep 2017 13:45), Max: 98.4 (13 Sep 2017 13:45)  HR: 62 (13 Sep 2017 16:45) (52 - 83)  BP: 150/84 (13 Sep 2017 16:45) (133/67 - 151/80)  BP(mean): --  RR: 16 (13 Sep 2017 16:45) (12 - 23)  SpO2: 97% (13 Sep 2017 16:45) (95% - 100%)    PE:  NAD, comfortable at rest  Breathing comfortably, alert awake, responding appropriately  neck flat, soft, incision c/d/i  No intra oral bleeding  hip incision intact, pain-free, no hematoma, dressing clean    A/P:  46yo Male hx ameloblastoma of R mandible s/p segmental mandibulectomy and fibular MVFF 3/1/17 c/b fibular flap failure with subsequent removal of flap 3/7/17. Patient now s/p left iliac crest bone graft for reconstruction of mandibular defect on 9/13.    -prn pain control  -prn nausea  -prn HTN  -Clinda IV for abx ppx  -dex 8q8  -AISS/FS  -restart home anti-HTN, lipitor and IBD meds  -up ad annie  -Holly   -FLD as tolerated  -HOB@30  -IS  -DVT ppx: SCDs, lovenox in AM    Dispo: SDU for airway monitoring    Seen with chief resident and d/w attending

## 2017-09-14 LAB
ALBUMIN SERPL ELPH-MCNC: 3.9 G/DL — SIGNIFICANT CHANGE UP (ref 3.3–5)
ALP SERPL-CCNC: 43 U/L — SIGNIFICANT CHANGE UP (ref 40–120)
ALT FLD-CCNC: 21 U/L — SIGNIFICANT CHANGE UP (ref 10–45)
ANION GAP SERPL CALC-SCNC: 13 MMOL/L — SIGNIFICANT CHANGE UP (ref 5–17)
AST SERPL-CCNC: 35 U/L — SIGNIFICANT CHANGE UP (ref 10–40)
BASOPHILS NFR BLD AUTO: 0.1 % — SIGNIFICANT CHANGE UP (ref 0–2)
BILIRUB SERPL-MCNC: 1.7 MG/DL — HIGH (ref 0.2–1.2)
BUN SERPL-MCNC: 15 MG/DL — SIGNIFICANT CHANGE UP (ref 7–23)
CALCIUM SERPL-MCNC: 9.1 MG/DL — SIGNIFICANT CHANGE UP (ref 8.4–10.5)
CHLORIDE SERPL-SCNC: 101 MMOL/L — SIGNIFICANT CHANGE UP (ref 96–108)
CO2 SERPL-SCNC: 27 MMOL/L — SIGNIFICANT CHANGE UP (ref 22–31)
CREAT SERPL-MCNC: 0.9 MG/DL — SIGNIFICANT CHANGE UP (ref 0.5–1.3)
GLUCOSE SERPL-MCNC: 133 MG/DL — HIGH (ref 70–99)
HCT VFR BLD CALC: 37.4 % — LOW (ref 39–50)
HGB BLD-MCNC: 12.5 G/DL — LOW (ref 13–17)
LYMPHOCYTES # BLD AUTO: 10 % — LOW (ref 13–44)
MAGNESIUM SERPL-MCNC: 2.2 MG/DL — SIGNIFICANT CHANGE UP (ref 1.6–2.6)
MCHC RBC-ENTMCNC: 29.6 PG — SIGNIFICANT CHANGE UP (ref 27–34)
MCHC RBC-ENTMCNC: 33.4 G/DL — SIGNIFICANT CHANGE UP (ref 32–36)
MCV RBC AUTO: 88.6 FL — SIGNIFICANT CHANGE UP (ref 80–100)
MONOCYTES NFR BLD AUTO: 8 % — SIGNIFICANT CHANGE UP (ref 2–14)
NEUTROPHILS NFR BLD AUTO: 81.9 % — HIGH (ref 43–77)
PHOSPHATE SERPL-MCNC: 3.6 MG/DL — SIGNIFICANT CHANGE UP (ref 2.5–4.5)
PLATELET # BLD AUTO: 240 K/UL — SIGNIFICANT CHANGE UP (ref 150–400)
POTASSIUM SERPL-MCNC: 4.3 MMOL/L — SIGNIFICANT CHANGE UP (ref 3.5–5.3)
POTASSIUM SERPL-SCNC: 4.3 MMOL/L — SIGNIFICANT CHANGE UP (ref 3.5–5.3)
PROT SERPL-MCNC: 6.6 G/DL — SIGNIFICANT CHANGE UP (ref 6–8.3)
RBC # BLD: 4.22 M/UL — SIGNIFICANT CHANGE UP (ref 4.2–5.8)
RBC # FLD: 13.4 % — SIGNIFICANT CHANGE UP (ref 10.3–16.9)
SODIUM SERPL-SCNC: 141 MMOL/L — SIGNIFICANT CHANGE UP (ref 135–145)
WBC # BLD: 14.5 K/UL — HIGH (ref 3.8–10.5)
WBC # FLD AUTO: 14.5 K/UL — HIGH (ref 3.8–10.5)

## 2017-09-14 RX ORDER — INFLUENZA VIRUS VACCINE 15; 15; 15; 15 UG/.5ML; UG/.5ML; UG/.5ML; UG/.5ML
0.5 SUSPENSION INTRAMUSCULAR ONCE
Qty: 0 | Refills: 0 | Status: COMPLETED | OUTPATIENT
Start: 2017-09-14 | End: 2017-09-14

## 2017-09-14 RX ADMIN — LOSARTAN POTASSIUM 50 MILLIGRAM(S): 100 TABLET, FILM COATED ORAL at 06:39

## 2017-09-14 RX ADMIN — Medication 101.6 MILLIGRAM(S): at 14:21

## 2017-09-14 RX ADMIN — Medication 101.6 MILLIGRAM(S): at 06:38

## 2017-09-14 RX ADMIN — ENOXAPARIN SODIUM 40 MILLIGRAM(S): 100 INJECTION SUBCUTANEOUS at 12:31

## 2017-09-14 RX ADMIN — Medication 100 MILLIGRAM(S): at 07:06

## 2017-09-14 RX ADMIN — Medication 12.5 MILLIGRAM(S): at 06:39

## 2017-09-14 RX ADMIN — OXYCODONE AND ACETAMINOPHEN 1 TABLET(S): 5; 325 TABLET ORAL at 01:00

## 2017-09-14 RX ADMIN — OXYCODONE AND ACETAMINOPHEN 1 TABLET(S): 5; 325 TABLET ORAL at 14:21

## 2017-09-14 RX ADMIN — Medication 100 MILLIGRAM(S): at 22:11

## 2017-09-14 RX ADMIN — Medication 100 MILLIGRAM(S): at 15:02

## 2017-09-14 RX ADMIN — OXYCODONE AND ACETAMINOPHEN 1 TABLET(S): 5; 325 TABLET ORAL at 15:00

## 2017-09-14 RX ADMIN — OXYCODONE AND ACETAMINOPHEN 1 TABLET(S): 5; 325 TABLET ORAL at 00:17

## 2017-09-14 NOTE — PROGRESS NOTE ADULT - SUBJECTIVE AND OBJECTIVE BOX
ENT [facility] DAILY PROGRESS NOTE  HPI: 48yo Male hx ameloblastoma of R mandible s/p segmental mandibulectomy and fibular MVFF 3/1/17 c/b fibular flap failure with subsequent removal of flap 3/7/17. Patient now s/p left iliac crest bone graft for reconstruction of mandibular defect on 9/13.    Overnight events: Pt did well overnight, pain was well controlled and no active bleeding or drainage from oral cavity. Tolerating full liquid diet. Passed trial to void overnight, received decadron overnight, afebrile, VSS.     MEDICATIONS:  Antiinfectives:   clindamycin IVPB 600 milliGRAM(s) IV Intermittent every 8 hours  rifaximin 550 milliGRAM(s) Oral three times a day    IV fluids:  lactated ringers. 1000 milliLiter(s) IV Continuous <Continuous>  dextrose 5%. 1000 milliLiter(s) IV Continuous <Continuous>    Hematologic/Anticoagulation:  enoxaparin Injectable 40 milliGRAM(s) SubCutaneous daily    Pain medications/Neuro:  acetaminophen   Tablet. 650 milliGRAM(s) Oral every 6 hours PRN  oxyCODONE    5 mG/acetaminophen 325 mG 1 Tablet(s) Oral every 4 hours PRN  ondansetron Injectable 4 milliGRAM(s) IV Push every 6 hours PRN  morphine  - Injectable 4 milliGRAM(s) IV Push every 15 minutes PRN    Endocrine Medications:   dextrose Gel 1 Dose(s) Oral Once PRN  dextrose 50% Injectable 12.5 Gram(s) IV Push Once  dextrose 50% Injectable 25 Gram(s) IV Push Once  dextrose 50% Injectable 25 Gram(s) IV Push Once  glucagon  Injectable 1 milliGRAM(s) IntraMuscular Once PRN  dexamethasone  IVPB 8 milliGRAM(s) IV Intermittent every 8 hours    All other standing medications:   BUpivacaine liposome 1.3% Injectable (no eMAR) 20 milliLiter(s) Local Injection Once  hydrochlorothiazide 12.5 milliGRAM(s) Oral daily    All other PRN medications:  labetalol Injectable 10 milliGRAM(s) IV Push every 4 hours PRN      Vital Signs Last 24 Hrs  T(C): 36.6 (14 Sep 2017 09:06), Max: 37.2 (13 Sep 2017 18:22)  T(F): 97.8 (14 Sep 2017 09:06), Max: 99 (13 Sep 2017 18:22)  HR: 71 (14 Sep 2017 09:06) (51 - 83)  BP: 123/558 (14 Sep 2017 09:06) (123/558 - 153/83)  BP(mean): 100 (14 Sep 2017 05:11) (99 - 100)  RR: 16 (14 Sep 2017 09:06) (12 - 23)  SpO2: 100% (14 Sep 2017 09:06) (95% - 100%)      09-13 @ 07:01  -  09-14 @ 07:00  --------------------------------------------------------  IN:    IV PiggyBack: 250 mL    lactated ringers.: 1143.8 mL    Oral Fluid: 920 mL  Total IN: 2313.8 mL    OUT:    Indwelling Catheter - Urethral: 495 mL    Voided: 1195 mL  Total OUT: 1690 mL    Total NET: 623.8 mL    PE:  NAD, comfortable at rest  Breathing comfortably, alert awake, responding appropriately  neck flat, soft, right neck incision c/d/i  No intra oral bleeding, rubber band in place anteriorly, second one added and good occlusion obtained.  hip incision intact, pain-free, no hematoma, dressing clean    LABS:  CBC-                        12.5   14.5  )-----------( 240      ( 14 Sep 2017 06:36 )             37.4     BMP/CMP-  14 Sep 2017 06:32    141    |  101    |  15     ----------------------------<  133    4.3     |  27     |  0.90     Ca    9.1        14 Sep 2017 06:32  Phos  3.6       14 Sep 2017 06:32  Mg     2.2       14 Sep 2017 06:32    TPro  6.6    /  Alb  3.9    /  TBili  1.7    /  DBili  x      /  AST  35     /  ALT  21     /  AlkPhos  43     14 Sep 2017 06:32    Coagulation Studies-    Endocrine Panel-  Calcium, Total Serum: 9.1 mg/dL (09-14 @ 06:32)    A/P:  48yo Male hx ameloblastoma of R mandible s/p segmental mandibulectomy and fibular MVFF 3/1/17 c/b fibular flap failure with subsequent removal of flap 3/7/17. Patient now s/p left iliac crest bone graft for reconstruction of mandibular defect on 9/13.    -prn pain control  -prn nausea  -prn HTN  -Clinda IV for abx ppx  -decadron 8q8  -AISS/FS  -restart home anti-HTN, lipitor and IBD meds  -up ad annie  -FLD as tolerated  -HOB@30  -DVT ppx: SCDs, lovenox in AM    Dispo: Home tomorrow

## 2017-09-15 ENCOUNTER — TRANSCRIPTION ENCOUNTER (OUTPATIENT)
Age: 47
End: 2017-09-15

## 2017-09-15 VITALS
RESPIRATION RATE: 16 BRPM | HEART RATE: 51 BPM | DIASTOLIC BLOOD PRESSURE: 73 MMHG | OXYGEN SATURATION: 99 % | TEMPERATURE: 98 F | SYSTOLIC BLOOD PRESSURE: 122 MMHG

## 2017-09-15 PROCEDURE — C1713: CPT

## 2017-09-15 PROCEDURE — 86850 RBC ANTIBODY SCREEN: CPT

## 2017-09-15 PROCEDURE — 84100 ASSAY OF PHOSPHORUS: CPT

## 2017-09-15 PROCEDURE — C1889: CPT

## 2017-09-15 PROCEDURE — 86900 BLOOD TYPING SEROLOGIC ABO: CPT

## 2017-09-15 PROCEDURE — 36415 COLL VENOUS BLD VENIPUNCTURE: CPT

## 2017-09-15 PROCEDURE — 83735 ASSAY OF MAGNESIUM: CPT

## 2017-09-15 PROCEDURE — 85025 COMPLETE CBC W/AUTO DIFF WBC: CPT

## 2017-09-15 PROCEDURE — 80053 COMPREHEN METABOLIC PANEL: CPT

## 2017-09-15 PROCEDURE — 86901 BLOOD TYPING SEROLOGIC RH(D): CPT

## 2017-09-15 RX ORDER — DOCUSATE SODIUM 100 MG
1 CAPSULE ORAL
Qty: 90 | Refills: 0 | OUTPATIENT
Start: 2017-09-15 | End: 2017-10-15

## 2017-09-15 RX ADMIN — Medication 100 MILLIGRAM(S): at 06:09

## 2017-09-15 NOTE — DISCHARGE NOTE ADULT - HOSPITAL COURSE
48yo Male hx ameloblastoma of R mandible s/p segmental mandibulectomy and fibular MVFF 3/1/17 c/b fibular flap failure with subsequent removal of flap 3/7/17. Patient now s/p left iliac crest bone graft for reconstruction of mandibular defect on 9/13. the patient was initially admitted to step down and did well. Overnight on POD 0 he passed his trial to void. He was started on a clear liquid diet which he tolerated on POD #1, and was advanced to full liquids. his pain was controlled well, and he received IV clindamycin for abx prophylaxis. His swelling of his right cheek continued to increase slightly throughout two days, but was relatively stable. He remained afebrile throughout, and on POD #2, the patient was doing well and tolerating PO and ambulating, and was stable for discharge with follow up with Dr. Lawler in 1-2 weeks.

## 2017-09-15 NOTE — DISCHARGE NOTE ADULT - PLAN OF CARE
s/p reconstruction with bone graft and plating full liquid diet, no exercise or straining for 2 weeks, can brush teeth gently and rinse oral cavity but do not brush on right side  Keep steri strips over right neck and left hip incisions until appointment, can shower but should not scrub steri strips

## 2017-09-15 NOTE — DISCHARGE NOTE ADULT - MEDICATION SUMMARY - MEDICATIONS TO TAKE
I will START or STAY ON the medications listed below when I get home from the hospital:    oxyCODONE-acetaminophen 5 mg-325 mg oral tablet  -- 1 tab(s) by mouth every 4 hours, As Needed -for moderate pain MDD:6 tablets   -- Caution federal law prohibits the transfer of this drug to any person other  than the person for whom it was prescribed.  May cause drowsiness.  Alcohol may intensify this effect.  Use care when operating dangerous machinery.  This prescription cannot be refilled.  This product contains acetaminophen.  Do not use  with any other product containing acetaminophen to prevent possible liver damage.  Using more of this medication than prescribed may cause serious breathing problems.    -- Indication: For Elective surgery for purposes other than treating health conditions    Lipitor 10 mg oral tablet  -- 1 tab(s) by mouth once a day  -- Indication: For HLD    losartan-hydroCHLOROthiazide 50mg-12.5mg oral tablet  -- 1 tab(s) by mouth once a day  -- Indication: For HTN    Colace 100 mg oral capsule  -- 1 cap(s) by mouth 3 times a day   -- Medication should be taken with plenty of water.    -- Indication: For constipation    clindamycin 75 mg/5 mL oral liquid  -- 40 milliliter(s) by mouth every 8 hours   -- Expires___________________  Finish all this medication unless otherwise directed by prescriber.  Medication should be taken with plenty of water.  Shake well before use.    -- Indication: For Abx prophylaxis    Xifaxan 550 mg oral tablet  -- 1 tab(s) by mouth 3 times a day  -- Indication: For IBS

## 2017-09-15 NOTE — DISCHARGE NOTE ADULT - CARE PROVIDER_API CALL
Elan Lawler; DDS), Dental Medicine; OralMaxillofacial Surgery  [address] [address]  Phone: [phone]  Fax: [phone]

## 2017-09-15 NOTE — PROGRESS NOTE ADULT - SUBJECTIVE AND OBJECTIVE BOX
ENT Progress Note:    HPI: 46yo Male hx ameloblastoma of R mandible s/p segmental mandibulectomy and fibular MVFF 3/1/17 c/b fibular flap failure with subsequent removal of flap 3/7/17. Patient now s/p left iliac crest bone graft for reconstruction of mandibular defect on 9/13.    Overnight events: Pt did well overnight, pain was well controlled and no active bleeding or drainage from oral cavity. Tolerating full liquid diet. One of the rubber bands popped that was helping occlusion, replaced.    MEDICATIONS:  Antiinfectives:   clindamycin IVPB 600 milliGRAM(s) IV Intermittent every 8 hours  rifaximin 550 milliGRAM(s) Oral three times a day    IV fluids:  dextrose 5%. 1000 milliLiter(s) IV Continuous <Continuous>    Hematologic/Anticoagulation:  enoxaparin Injectable 40 milliGRAM(s) SubCutaneous daily    Pain medications/Neuro:  acetaminophen   Tablet. 650 milliGRAM(s) Oral every 6 hours PRN  oxyCODONE    5 mG/acetaminophen 325 mG 1 Tablet(s) Oral every 4 hours PRN  ondansetron Injectable 4 milliGRAM(s) IV Push every 6 hours PRN  morphine  - Injectable 4 milliGRAM(s) IV Push every 15 minutes PRN    Endocrine Medications:   dextrose Gel 1 Dose(s) Oral Once PRN  dextrose 50% Injectable 12.5 Gram(s) IV Push Once  dextrose 50% Injectable 25 Gram(s) IV Push Once  dextrose 50% Injectable 25 Gram(s) IV Push Once  glucagon  Injectable 1 milliGRAM(s) IntraMuscular Once PRN    All other standing medications:   BUpivacaine liposome 1.3% Injectable (no eMAR) 20 milliLiter(s) Local Injection Once  hydrochlorothiazide 12.5 milliGRAM(s) Oral daily    All other PRN medications:  labetalol Injectable 10 milliGRAM(s) IV Push every 4 hours PRN      Vital Signs Last 24 Hrs  T(C): 36.2 (15 Sep 2017 04:39), Max: 37.2 (14 Sep 2017 14:14)  T(F): 97.2 (15 Sep 2017 04:39), Max: 99 (14 Sep 2017 14:14)  HR: 53 (15 Sep 2017 04:39) (53 - 71)  BP: 127/78 (15 Sep 2017 04:39) (118/68 - 127/78)  BP(mean): --  RR: 16 (15 Sep 2017 04:39) (15 - 17)  SpO2: 99% (15 Sep 2017 04:39) (97% - 100%)      09-14 @ 07:01  -  09-15 @ 07:00  --------------------------------------------------------  IN:    IV PiggyBack: 150 mL    Oral Fluid: 1740 mL  Total IN: 1890 mL    OUT:    Voided: 1350 mL  Total OUT: 1350 mL    Total NET: 540 mL            PHYSICAL EXAM:  NAD, comfortable at rest  Breathing comfortably, alert awake, responding appropriately  neck flat, soft, right neck incision c/d/i  No intra oral bleeding, rubber band in place anteriorly, second one added and good occlusion obtained.  hip incision intact, pain-free, no hematoma, dressing clean    LABS:  CBC-                        12.5   14.5  )-----------( 240      ( 14 Sep 2017 06:36 )             37.4     BMP/CMP-  14 Sep 2017 06:32    141    |  101    |  15     ----------------------------<  133    4.3     |  27     |  0.90     Ca    9.1        14 Sep 2017 06:32  Phos  3.6       14 Sep 2017 06:32  Mg     2.2       14 Sep 2017 06:32    TPro  6.6    /  Alb  3.9    /  TBili  1.7    /  DBili  x      /  AST  35     /  ALT  21     /  AlkPhos  43     14 Sep 2017 06:32      Assessment/Plan:  47y Male    A/P:  46yo Male hx ameloblastoma of R mandible s/p segmental mandibulectomy and fibular MVFF 3/1/17 c/b fibular flap failure with subsequent removal of flap 3/7/17. Patient now s/p left iliac crest bone graft for reconstruction of mandibular defect on 9/13.    -Did well, will likely go home today  -Liquid clindamycin for 2 weeks for home  -Continue full liquid diet  -Can brush teeth and rinse with peridex, should not brush on right bottom side  -Pain medication sent to pharmacy  -Follow up with Dr. Lawler upon dispo  Dispo: Home tomorrow

## 2017-09-18 DIAGNOSIS — Z88.0 ALLERGY STATUS TO PENICILLIN: ICD-10-CM

## 2017-09-18 DIAGNOSIS — M19.90 UNSPECIFIED OSTEOARTHRITIS, UNSPECIFIED SITE: ICD-10-CM

## 2017-09-18 DIAGNOSIS — K58.9 IRRITABLE BOWEL SYNDROME WITHOUT DIARRHEA: ICD-10-CM

## 2017-09-18 DIAGNOSIS — Z82.49 FAMILY HISTORY OF ISCHEMIC HEART DISEASE AND OTHER DISEASES OF THE CIRCULATORY SYSTEM: ICD-10-CM

## 2017-09-18 DIAGNOSIS — M95.2 OTHER ACQUIRED DEFORMITY OF HEAD: ICD-10-CM

## 2017-09-18 DIAGNOSIS — G47.33 OBSTRUCTIVE SLEEP APNEA (ADULT) (PEDIATRIC): ICD-10-CM

## 2017-09-18 DIAGNOSIS — I10 ESSENTIAL (PRIMARY) HYPERTENSION: ICD-10-CM

## 2017-09-18 DIAGNOSIS — Z85.89 PERSONAL HISTORY OF MALIGNANT NEOPLASM OF OTHER ORGANS AND SYSTEMS: ICD-10-CM

## 2017-09-18 DIAGNOSIS — Z87.891 PERSONAL HISTORY OF NICOTINE DEPENDENCE: ICD-10-CM

## 2017-10-15 NOTE — DIETITIAN INITIAL EVALUATION ADULT. - NUTRITION INTERVENTION
"(S)  Minimal cramping on PO Cytotec protocol.  Cough improved.    (O)  Vital signs:  Temp: 98.5  F (36.9  C) Temp src: Oral BP: 105/74 Pulse: 105   Resp: 16 SpO2: 97 % O2 Device: None (Room air)        Estimated body mass index is 41.97 kg/(m^2) as calculated from the following:    Height as of 10/10/17: 1.676 m (5' 6\").    Weight as of 10/10/17: 117.9 kg (260 lb).    BS 81    Cervix:  FT, Post, thick, high  Cephalic    Fetal Heart Rate Tracing: reactive cat 1  Tocometer: irreg ctx    (A/P)  GDM on insulin.  Continue Cytotec cervical ripening and IOL.  Paroxysmal cough with RAD, post viral.  Improving on current treatment.    " Medical Food Supplements/Enteral Nutrition Enteral Nutrition/Nutrition Education/Medical Food Supplements Nutrition Education/Enteral Nutrition

## 2018-03-07 NOTE — DISCHARGE NOTE ADULT - PHYSICIAN SECTION COMPLETE
Left message for patient to schedule a follow up appointment and to bring ID when she picks up script at .    Placed up front in accordion file.   Yes

## 2019-02-26 NOTE — DISCHARGE NOTE ADULT - NS AS DC STROKE DX YN
Problem: Adult Mental Health  Intervention: Implement and maintain protective environment  safety checks, suicide precautions   Intervention: Encourage patient to seek out clinical staff when having urges for suicide, self harm, violence   02/26/19 0839   Adult Mental Health   Encourage patient to seek out clinical staff when having urges for suicide, self harm, violence Done       Goal: Reports or exhibits an improvement in mood signs/symptoms associated with anxiety  Outcome: Outcome Not Met, Continue to Monitor   02/26/19 0810    MENTAL STATUS   Mood Depressed   --MENTAL STATUS--   Affect/Behavior Anxious;Depressed;Suicidal/suicidal ideation     Goal: Reports a decrease in thoughts of suicide and/or violence  Outcome: Outcome Not Met, Continue to Monitor   02/26/19 0839   Suicide Risk Assessment   Current Suicidal Thought / Ideation Yes   Homicide Assessment   Report of Homicidal Thoughts No     Goal: Denies having suicidal or homicidal plans  Outcome: Outcome Met, Continue evaluating goal progress toward completion   02/26/19 0839   Suicide Risk Assessment   Developing Plan No   Access to Means No   Homicide Assessment   Threats Harm to Others/Plan  No   Endorses thoughts of not wanting to be alive, denies plan    Comments: Patient observed in milieu at time of assessment, poor eye contact when speaking with writer, asks for assistance finding dining room, reports continued intermittent intrusive thoughts of suicide, denies suicidal plans, agrees to notify staff if safety concerns occur and coping techniques are ineffective, denies further nursing need at time of assessment. no

## 2019-04-16 NOTE — DISCHARGE NOTE ADULT - PATIENT PORTAL LINK FT
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  Informed pt his last and only xray was 2017.  Gave pt a copy.  Instructed to make appt w/Dr Arenas and if hospice accepts x-ray he can cx.  Pt agreeable.  Left with xray copy to forward to hospice.  Pt will f/u.   “You can access the [facility] Patient Portal, offered by [facility], by registering with the following website: [email or web address]”

## 2019-08-23 NOTE — PRE-OP CHECKLIST - PATIENT'S PERSONAL PROPERTY REMOVED
Patient:   GLADYS DHILLON            MRN: [id number]            FIN: [id number]              Age:   15 years     Sex:  MALE     :  02   Associated Diagnoses:   None   Author:   MARY MOMIN     Postoperative Information     Postoperative Information   Postoperative Information:          Preoperative Diagnosis:    flexor tendon injury right ring finger.         Postoperative Diagnosis: Same As Pre-Op Dx.         Performed by:    ABBIE MORALES (Surgeon - Primary)  .         Assistant: MARY MOMIN.         Surgical Tasks Performed by Assistant: None.         Procedures Performed:    FLEXOR TENDON EXCISION, TENOLYSIS OF RIGHT RING FINGER  .         Findings: No Abnormal Findings.         Specimens Removed: None.         Estimated Blood Loss: 1  ml.         Blood Administered: No.         Complications: None.         Grafts/Implants: None.    Anesthetic utilized:  General.   n/a

## 2021-08-11 NOTE — DISCHARGE NOTE ADULT - CLICK TO LAUNCH ORM
[facility]  Certificate of Child Health Examination  Student's Name:   Rimma York Birth Date  2011  Sex  male  Race/Ethnicity   School/Grade Level/ID#     Address:   [address] [place] [id number]  Parent/Guardian             Telephone#                                            Home:                               Work:   IMMUNIZATIONS: To be completed by health care provider. The mo/da/yr for every dose administered is required. If a specific vaccine is medically contraindicated, a separate written statement must be attached by the health care responsible for completing the health examination explaining the medical reason for the contraindication.      Immunization History   Administered Date(s) Administered   • DTaP(INFANRIX) 09/28/2012   • DTaP/HIB/IPV 01/24/2011, 03/21/2011, 05/23/2011   • DTaP/IPV 06/24/2015   • Hep A, Unspecified formulation 11/21/2011, 09/28/2012   • Hep B, adult 2011, 01/24/2011, 05/23/2011   • Hib (PRP-T) 02/20/2012   • Influenza, injectable, quadrivalent 11/05/2011, 02/20/2012   • Influenza, injectable, quadrivalent, preservative-free 10/24/2019   • Influenza, live, intranasal, quadrivalent 11/02/2013   • MMR 02/20/2012, 06/24/2015   • Pneumococcal Conjugate 13 valent 01/24/2011, 03/21/2011, 05/23/2011, 11/21/2011   • Pneumococcal polysaccharide, adult, 23 valent 04/24/2013   • Rotavirus - pentavalent 01/24/2011, 03/21/2011, 05/23/2011   • Varicella 11/21/2011, 06/24/2015      Immunizations given 8/11/2021: None      Health care provider (MD, DO, APN, PA, school health professional, health official) verifying above immunization history must sign below. If adding dates to the above immunization history section, put your initials by date(s) and sign here.)  Signature                              Title                                                Date  8/11/2021  _____________________________________________________________________________________  Signature                                                                 Title                                                Date  _____________________________________________________________________________________   ALTERNATIVE PROOF OF IMMUNITY   1. Clinical diagnosis (measles, mumps, hepatitis B) is allowed when verified by physician and supported with lab confirmation.  Attach copy of lab result.    * MEASLES (Rubeola)  MO DA  YR          **MUMPS  MO DA YR             HEPATITIS B  MO DA YR           VARICELLA  MO DA  YR      2. History of varicella (chickenpox) disease is acceptable if verified by health care provider, school health professional or health official.  Person signing below verifies that the parent/guardian’s description of varicella disease history is indicative of past infection and is accepting such history as documentation of disease.  Date of Disease                                  Signature                                                           Title   3. Laboratory Evidence of Immunity (check one)      __ Measles*         __ Mumps**        __ Rubella        __Varicella    (Attach copy of lab result)         *All measles cases diagnosed on or after July 1, 2002, must be confirmed by laboratory evidence.      **All mumps cases diagnosed on or after July 1, 2013, must be confirmed by laboratory evidence.     Completion of Alternative 1 or 3 MUST be accompanied by Labs & Physician Signature: ___________________________  Physician Statements of Immunity MUST be submitted to [facility] for review.     Certificates of Spiritism Exemption to Immunizations or Physician Medical Statements of Medical Contraindication Are Reviewed   and Maintained by the School Authority     (11/2015)                                         (COMPLETE BOTH SIDES)                                  Approved [facility]  3/2016      Student's Name:  Rimma York Birth Date 2011 Sex male School Grade Level/ID#     Page 2 of 3   HEALTH HISTORY      TO BE COMPLETED AND SIGNED BY PARENT/GUARDIAN AND VERIFIED BY HEALTH CARE PROVIDER  ALLERGIES: (Food, drug, insect, other) Yes is allergic to amoxicillin and amoxicillin-pot clavulanate.   MEDICATION: (List all prescribed or taken on a regular basis.)   No   Diagnosis of asthma?  Child wakes during night coughing? Yes    No  Yes    No  Loss of function of one of paired  organs?(eye/ear/kidney/testicle) Yes    No    Birth defects? Yes    No  Hospitalizations? Yes    No    Developmental delay? Yes    No   When? What for? Yes    No    Blood disorders? Hemophilia,  Sickle Cell, Other? Explain. Yes    No  Surgery? (List all.)  When? What for? Yes    No    Diabetes? Yes    No  Serious injury or illness? Yes    No    Head injury/Concussion/Passed out? Yes    No  TB skin test positive (past/present)? * Yes    No *If yes, refer to local [facility] dept   Seizures? What are they like?  Yes    No  TB disease (past or present)? * Yes    No *If yes, refer to local [facility] dept   Heart problem/Shortness of breath?  Yes    No  Tobacco use (type, frequency)?  Yes    No    Heart murmur/High blood pressure? Yes    No  Alcohol/Drug use?  Yes    No    Dizziness or chest pain with exercise? Yes    No  Family history of sudden death  before age 50? (Cause?) Yes    No    Eye/Vision problems? ______           __Glasses          __Contacts  Last exam by eye doctor ______  Other concerns? (crossed eye, drooping lids, squinting, difficulty reading) Dental?   __ Braces     __ Bridge     __ Plate   __Other   Ear/Hearing problems? Yes    No  Information may be shared with appropriate personnel for health and educational purposes.   Bone/Joint problem/injury/scoliosis? Yes    No  Parent/Guardian  Signature                                               Date   PHYSICAL EXAMINATION REQUIREMENTS   Entire section below to  . be completed by MD//KOKI/PA Head Circumference if <2-3 years old - /60   Pulse 98   Temp 97.9 °F (36.6 °C) (Temporal)   Ht 5' 3.58\" (1.615 m)   Wt (!) 58.9 kg (129 lb 12.8 oz)   BMI 22.57 kg/m²   BSA 1.62 m²    94 %ile (Z= 1.59) based on CDC (Boys, 2-20 Years) BMI-for-age based on BMI available as of 8/11/2021.   DIABETES SCREENING (NOT REQUIRED FOR ) BMI>85% age/sex Yes     And any two of the following: Family History: No  Ethnic Minority: Yes    Signs of Insulin Resistance (hypertension, dyslipidemia, polycystic ovarian syndrome, acanthosis nigricans): No  At Risk: Yes   LEAD RISK QUESTIONNAIRE Required for children age 6 months through 6 years enrolled in licensed or public school operated day care, , nursery school and/or . (Blood test required if resides in [place] or high risk zip code.)  Questionnaire Administered? Yes  Blood Test Indicated? No   Blood Test Date _____   Blood Test Result ______________   TB SKIN OR BLOOD TEST Recommended only for children in high-risk groups including children immunosuppressed due to HIV infection or other conditions, frequent travel to or born in high prevalence countries or those exposed to adults in high-risk categories. See CDC guidelines. http://www.cdc.gov/tb/publications/factsheets/testing/TB_testing.htm.  Test Needed: No     Test performed: No                          Skin Test:    Date Read              /      /             Result:   Positive__      Negative__        mm________                          Blood Test: Date Reported       /      /               Result:  Positive__      Negative__      Value________  LAB TESTS (recommended) Date/Result  Date/Results   Hemoglobin or Hematocrit NA Sickle Cell (when indicated) NA   Urinalysis NA Developmental Screening Tool NA        SYSTEM REVIEW Normal  Comments/Follow-up/Needs  Normal Comments/Follow-up/Needs   Skin  Yes  Endocrine Yes    Ears  Congenital deafness, has cochlear  implants  Gastrointestinal Yes    Eyes Yes                  Screening Result: Genito-Urinary Yes                                            LMP   Nose Yes  Neurologic Yes    Throat Yes  Musculoskeletal Yes    Mouth/Dental Yes  Spinal Exam Yes    Cardiovascular/HTN Yes  Nutritional status Yes    Respiratory Yes Diagnosis of Asthma: No   Mental Health Yes    Currently Prescribed Asthma Medication:        No  Quick-relief medication (e.g. Short Acting Beta Antagonist)        No   Controller medication (e.g. inhaled corticosteroid) Other     NEEDS/MODIFICATIONS required in the school setting:  DIETARY Needs/Restrictions: No restrictions   SPECIAL INSTRUCTIONS/DEVICES e.g. safety glasses, glass eye, chest protector for arrhythmia, pacemaker, prosthetic device, dental bridge, false teeth, athletic support/cup: Has cochlear implants    MENTAL HEALTH/OTHER Is there anything else the school should know about this student?  If you would like to discuss this student’s health with school or school health personnel:  Not needed   EMERGENCY ACTION needed while at school due to child’s health condition (e.g. ,seizures, asthma, insect sting, food, peanut allergy, bleeding problem, diabetes, heart problem)?   No   On the basis of the examination on this day, I approve this child’s participation in                                (If No or Modified please attach explanation.)  PHYSICAL EDUCATION:  Yes                               [employer]   Yes   Print Name  Shelby YatesDO         Signature                                                                        Date: 8/11/2021   Address:  [address] [address]  [phone]         (Complete Both Sides)     Approved [facility] 3/2016

## 2021-09-10 NOTE — DISCHARGE NOTE ADULT - INSTRUCTIONS
Returned call and left message for patient at      Telephone Information:   Mobile [phone]   with questions regarding prep.  Asked about pills or if liquid prep, one that tastes better. Left message that the lower volume prep was sent.  Patient to return call to [facility] DUVALL [phone] .   full liquid diet for at least 2 weeks, no exercising or straining for 2 weeks

## 2022-04-15 PROBLEM — D16.5 BENIGN NEOPLASM OF LOWER JAW BONE: Chronic | Status: ACTIVE | Noted: 2017-09-13

## 2022-05-05 ENCOUNTER — APPOINTMENT (OUTPATIENT)
Dept: OPHTHALMOLOGY | Facility: CLINIC | Age: 52
End: 2022-05-05
Payer: COMMERCIAL

## 2022-05-05 ENCOUNTER — NON-APPOINTMENT (OUTPATIENT)
Age: 52
End: 2022-05-05

## 2022-05-05 PROCEDURE — 92004 COMPRE OPH EXAM NEW PT 1/>: CPT

## 2022-05-11 ENCOUNTER — TRANSCRIPTION ENCOUNTER (OUTPATIENT)
Age: 52
End: 2022-05-11

## 2023-07-12 NOTE — BRIEF OPERATIVE NOTE - PROCEDURE
Anesthesia Post-op Note    Patient: iMchele Marte  Procedure(s) Performed: ICD GENERATOR CHANGE - CV; ICD ANALYSIS W/ PROGRAM; ICD SINGLE GENERATOR REMOVE; ICD SINGLE GENERATOR REPLACE  Anesthesia type: MAC    Vitals Value Taken Time   Temp >36 07/12/23 1703   Pulse 78 07/12/23 1703   Resp 19 07/12/23 1703   SpO2 100 07/12/23 1703   BP 98/54 07/12/23 1703         Patient Location: bedside  Post-op Vital Signs:stable  Level of Consciousness: participates in exam, awake and oriented  Respiratory Status: spontaneous ventilation and face mask  Cardiovascular blood pressure returned to baseline  Hydration: unable to assess  Pain Management: well controlled  Handoff: Handoff to receiving clinician was performed and questions were answered  Vomiting: none  Nausea: None  Airway Patency:patent  Post-op Assessment: awake, alert, appropriately conversant, or baseline, no complications, patient tolerated procedure well, no evidence of recall, dentition within defined limits and moving all extremities      There were no known notable events for this encounter.   <<-----Click on this checkbox to enter Procedure Exploration, neck  09/13/2017    Active  DORA  Bone graft from iliac crest  09/13/2017    Active  DORA

## 2023-10-05 NOTE — ASU PREOP CHECKLIST - AS TEMP SITE
skin Doxepin Counseling:  Patient advised that the medication is sedating and not to drive a car after taking this medication. Patient informed of potential adverse effects including but not limited to dry mouth, urinary retention, and blurry vision.  The patient verbalized understanding of the proper use and possible adverse effects of doxepin.  All of the patient's questions and concerns were addressed.

## 2024-12-04 NOTE — PROGRESS NOTE ADULT - ASSESSMENT
46 y/o male s/p marginal mandibulectomy with free fibula vascularized bone flap  - dispo as per ENT  - partial weightbearing on donor site with CAM boot  - keep neck in neutral position
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
No
46 y/o male s/p marginal mandibulectomy with free fibula vascularized bone flap  - dispo as per ENT  - partial weightbearing on donor site with CAM boot  - keep neck in neutral position  - OT to see him today
46 y/o male s/p marginal mandibulectomy with free fibula vascularized bone flap  - partial weightbearing on donor site with CAM boot  - High output neck drain likely related to intraoral communication.  After discussion with ENT, plan is to replace the Dopoff feeding tube and to restart tube feeds to see how neck drain output changes.  - OT to see him today
47M s/p segmental mandibulectomy, reconstruction with left osteocutaneous fibula free flap. POD 6. c/b intraoral leak.
48 y/o male s/p marginal mandibulectomy with free fibula vascularized bone flap  - dispo as per ENT  - partial weightbearing on donor site once patient receives CAM boot  - keep neck in neutral position
POD#1 s/p explantation of thrombosed non viable fibula flap right mandible reconstruction. Intraoral wound was completely closed intraoperatively yesterday. Tolerating clear liquid diet. I had a long conversation with him today in addition to last night with his wife - reviewing the plan moving forward and options for future reconstruction - including nonvascularized bone graft vs repeat microvascular fibula flap reconstruction. I explained that the recon plate alone would not last in the long run and likely break or erode through the skin. However there is no need to perform any additional reconstructive procedures for a few months until he has had time to heal.    Will continue current care - and start dispo planning.
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    Suggest:  -Can d/c vanc/CAX  -Agree with continuing clinda until after drain is d/kyleigh  -He would benefit from allergy testing for PCN  Please recall if further ID input is desired.

## 2025-01-06 NOTE — DISCHARGE NOTE ADULT - NURSING SECTION COMPLETE
Patient/Caregiver provided printed discharge information.

## 2025-05-01 NOTE — PATIENT PROFILE ADULT. - NS TRANSFER PATIENT BELONGINGS
Anesthesia Post Evaluation    Patient: [facility]    Procedure(s) Performed: Procedure(s) (LRB):  ABDOMINOPLASTY (CASH) (N/A)    Final Anesthesia Type: general      Patient location during evaluation: PACU  Patient participation: Yes- Able to Participate  Level of consciousness: awake and alert  Post-procedure vital signs: reviewed and stable  Pain management: adequate  Airway patency: patent      Anesthetic complications: no      Cardiovascular status: blood pressure returned to baseline  Respiratory status: unassisted  Hydration status: euvolemic  Follow-up not needed.              Vitals Value Taken Time   /72 04/10/25 12:13   Temp 36.7 °C (98.1 °F) 04/10/25 09:44   Pulse 95 04/10/25 12:13   Resp 18 04/10/25 12:13   SpO2 96 % 04/10/25 12:13         Event Time   Out of Recovery 09:41:00         Pain/Leonard Score: No data recorded         Clothing